# Patient Record
Sex: FEMALE | Race: ASIAN | NOT HISPANIC OR LATINO | Employment: UNEMPLOYED | URBAN - METROPOLITAN AREA
[De-identification: names, ages, dates, MRNs, and addresses within clinical notes are randomized per-mention and may not be internally consistent; named-entity substitution may affect disease eponyms.]

---

## 2023-03-09 LAB — HCG, URINE: NORMAL

## 2023-03-16 LAB — HCG, URINE: NORMAL

## 2023-03-28 LAB — SARS-COV-2 RESULT: NOT DETECTED

## 2023-03-29 ENCOUNTER — HOSPITAL ENCOUNTER (OUTPATIENT)
Dept: DATA CONVERSION | Facility: HOSPITAL | Age: 38
End: 2023-03-30
Attending: SURGERY | Admitting: SURGERY
Payer: COMMERCIAL

## 2023-03-29 DIAGNOSIS — C73 MALIGNANT NEOPLASM OF THYROID GLAND (MULTI): ICD-10-CM

## 2023-03-29 DIAGNOSIS — C50.919 MALIGNANT NEOPLASM OF UNSPECIFIED SITE OF UNSPECIFIED FEMALE BREAST (MULTI): ICD-10-CM

## 2023-03-29 DIAGNOSIS — E04.2 NONTOXIC MULTINODULAR GOITER: ICD-10-CM

## 2023-03-29 DIAGNOSIS — E06.3 AUTOIMMUNE THYROIDITIS: ICD-10-CM

## 2023-03-30 LAB
CALCIUM (MG/DL) IN SER/PLAS: 9.3 MG/DL (ref 8.6–10.6)
CALCIUM (MG/DL) IN SER/PLAS: 9.6 MG/DL (ref 8.6–10.6)
CALCIUM (MG/DL) IN SER/PLAS: NORMAL

## 2023-04-05 LAB — HCG, URINE: NEGATIVE

## 2023-04-14 LAB
ACTIVATED PARTIAL THROMBOPLASTIN TIME IN PPP BY COAGULATION ASSAY: 37 SEC (ref 26–39)
CALCIUM (MG/DL) IN SER/PLAS: 9.8 MG/DL (ref 8.6–10.3)
INR IN PPP BY COAGULATION ASSAY: 1 (ref 0.9–1.1)
PROTHROMBIN TIME (PT) IN PPP BY COAGULATION ASSAY: 12 SEC (ref 9.8–13.4)
THYROTROPIN (MIU/L) IN SER/PLAS BY DETECTION LIMIT <= 0.05 MIU/L: 0.92 MIU/L (ref 0.44–3.98)

## 2023-04-17 LAB
THYROGLOBULIN AB (IU/ML) IN SER/PLAS: 1.6 IU/ML (ref 0–4)
THYROGLOBULIN LC-MS/MS: ABNORMAL NG/ML (ref 1.3–31.8)
THYROGLOBULIN: 0.1 NG/ML (ref 1.3–31.8)

## 2023-04-19 LAB
COMPLETE PATHOLOGY REPORT: NORMAL
CONVERTED ADDENDUM COMMENT 2: NORMAL
CONVERTED CLINICAL DIAGNOSIS-HISTORY: NORMAL
CONVERTED FINAL DIAGNOSIS: NORMAL
CONVERTED FINAL REPORT PDF LINK TO COPY AND PASTE: NORMAL
CONVERTED GROSS DESCRIPTION: NORMAL
CONVERTED INTRAOPERATIVE DIAGNOSIS: NORMAL
CONVERTED SYNOPTIC DIAGNOSIS: NORMAL

## 2023-08-24 LAB
BASOPHILS (10*3/UL) IN BLOOD BY AUTOMATED COUNT: 0.02 X10E9/L (ref 0–0.1)
BASOPHILS/100 LEUKOCYTES IN BLOOD BY AUTOMATED COUNT: 0.5 % (ref 0–2)
CHOLESTEROL (MG/DL) IN SER/PLAS: 169 MG/DL (ref 0–199)
CHOLESTEROL IN HDL (MG/DL) IN SER/PLAS: 33 MG/DL
CHOLESTEROL/HDL RATIO: 5.1
EOSINOPHILS (10*3/UL) IN BLOOD BY AUTOMATED COUNT: 0.04 X10E9/L (ref 0–0.7)
EOSINOPHILS/100 LEUKOCYTES IN BLOOD BY AUTOMATED COUNT: 1 % (ref 0–6)
ERYTHROCYTE DISTRIBUTION WIDTH (RATIO) BY AUTOMATED COUNT: 14 % (ref 11.5–14.5)
ERYTHROCYTE MEAN CORPUSCULAR HEMOGLOBIN CONCENTRATION (G/DL) BY AUTOMATED: 32.1 G/DL (ref 32–36)
ERYTHROCYTE MEAN CORPUSCULAR VOLUME (FL) BY AUTOMATED COUNT: 82 FL (ref 80–100)
ERYTHROCYTES (10*6/UL) IN BLOOD BY AUTOMATED COUNT: 4.72 X10E12/L (ref 4–5.2)
ESTIMATED AVERAGE GLUCOSE FOR HBA1C: 103 MG/DL
HEMATOCRIT (%) IN BLOOD BY AUTOMATED COUNT: 38.6 % (ref 36–46)
HEMOGLOBIN (G/DL) IN BLOOD: 12.4 G/DL (ref 12–16)
HEMOGLOBIN A1C/HEMOGLOBIN TOTAL IN BLOOD: 5.2 %
IMMATURE GRANULOCYTES/100 LEUKOCYTES IN BLOOD BY AUTOMATED COUNT: 0.2 % (ref 0–0.9)
IRON (UG/DL) IN SER/PLAS: 67 UG/DL (ref 35–150)
IRON BINDING CAPACITY (UG/DL) IN SER/PLAS: 386 UG/DL (ref 240–445)
IRON SATURATION (%) IN SER/PLAS: 17 % (ref 25–45)
LDL: ABNORMAL MG/DL (ref 0–99)
LEUKOCYTES (10*3/UL) IN BLOOD BY AUTOMATED COUNT: 4 X10E9/L (ref 4.4–11.3)
LYMPHOCYTES (10*3/UL) IN BLOOD BY AUTOMATED COUNT: 1.63 X10E9/L (ref 1.2–4.8)
LYMPHOCYTES/100 LEUKOCYTES IN BLOOD BY AUTOMATED COUNT: 40.4 % (ref 13–44)
MONOCYTES (10*3/UL) IN BLOOD BY AUTOMATED COUNT: 0.26 X10E9/L (ref 0.1–1)
MONOCYTES/100 LEUKOCYTES IN BLOOD BY AUTOMATED COUNT: 6.5 % (ref 2–10)
NEUTROPHILS (10*3/UL) IN BLOOD BY AUTOMATED COUNT: 2.07 X10E9/L (ref 1.2–7.7)
NEUTROPHILS/100 LEUKOCYTES IN BLOOD BY AUTOMATED COUNT: 51.4 % (ref 40–80)
NRBC (PER 100 WBCS) BY AUTOMATED COUNT: 0 /100 WBC (ref 0–0)
PLATELETS (10*3/UL) IN BLOOD AUTOMATED COUNT: 211 X10E9/L (ref 150–450)
THYROTROPIN (MIU/L) IN SER/PLAS BY DETECTION LIMIT <= 0.05 MIU/L: 2.39 MIU/L (ref 0.44–3.98)
TRIGLYCERIDE (MG/DL) IN SER/PLAS: 525 MG/DL (ref 0–149)
VLDL: ABNORMAL MG/DL (ref 0–40)

## 2023-09-05 LAB
ALANINE AMINOTRANSFERASE (SGPT) (U/L) IN SER/PLAS: 15 U/L (ref 7–45)
ALBUMIN (G/DL) IN SER/PLAS: 4.4 G/DL (ref 3.4–5)
ALKALINE PHOSPHATASE (U/L) IN SER/PLAS: 48 U/L (ref 33–110)
ANION GAP IN SER/PLAS: 13 MMOL/L (ref 10–20)
ASPARTATE AMINOTRANSFERASE (SGOT) (U/L) IN SER/PLAS: 20 U/L (ref 9–39)
BILIRUBIN TOTAL (MG/DL) IN SER/PLAS: 0.3 MG/DL (ref 0–1.2)
CALCIDIOL (25 OH VITAMIN D3) (NG/ML) IN SER/PLAS: 43 NG/ML
CALCIUM (MG/DL) IN SER/PLAS: 9.5 MG/DL (ref 8.6–10.3)
CARBON DIOXIDE, TOTAL (MMOL/L) IN SER/PLAS: 25 MMOL/L (ref 21–32)
CHLORIDE (MMOL/L) IN SER/PLAS: 105 MMOL/L (ref 98–107)
CREATININE (MG/DL) IN SER/PLAS: 0.54 MG/DL (ref 0.5–1.05)
GFR FEMALE: >90 ML/MIN/1.73M2
GLUCOSE (MG/DL) IN SER/PLAS: 92 MG/DL (ref 74–99)
PARATHYRIN INTACT (PG/ML) IN SER/PLAS: 34.2 PG/ML (ref 18.5–88)
POTASSIUM (MMOL/L) IN SER/PLAS: 4.3 MMOL/L (ref 3.5–5.3)
PROTEIN TOTAL: 8.3 G/DL (ref 6.4–8.2)
SODIUM (MMOL/L) IN SER/PLAS: 139 MMOL/L (ref 136–145)
THYROTROPIN (MIU/L) IN SER/PLAS BY DETECTION LIMIT <= 0.05 MIU/L: 3.62 MIU/L (ref 0.44–3.98)
UREA NITROGEN (MG/DL) IN SER/PLAS: 10 MG/DL (ref 6–23)

## 2023-09-07 VITALS
TEMPERATURE: 99 F | HEIGHT: 60 IN | SYSTOLIC BLOOD PRESSURE: 135 MMHG | DIASTOLIC BLOOD PRESSURE: 76 MMHG | BODY MASS INDEX: 41.25 KG/M2 | RESPIRATION RATE: 16 BRPM | HEART RATE: 82 BPM | WEIGHT: 210.1 LBS

## 2023-09-08 LAB
THYROGLOBULIN AB (IU/ML) IN SER/PLAS: 1.4 IU/ML (ref 0–4)
THYROGLOBULIN LC-MS/MS: ABNORMAL NG/ML (ref 1.3–31.8)
THYROGLOBULIN: 0.1 NG/ML (ref 1.3–31.8)

## 2023-09-10 PROBLEM — E04.2 MULTINODULAR THYROID: Status: ACTIVE | Noted: 2023-09-10

## 2023-09-10 PROBLEM — K92.1 HEMATOCHEZIA: Status: ACTIVE | Noted: 2023-09-10

## 2023-09-10 PROBLEM — F41.0 PANIC ATTACK: Status: ACTIVE | Noted: 2023-09-10

## 2023-09-10 PROBLEM — N63.20 UNSPECIFIED LUMP IN THE LEFT BREAST, UNSPECIFIED QUADRANT: Status: ACTIVE | Noted: 2023-09-10

## 2023-09-10 PROBLEM — R53.1 WEAKNESS PRESENT: Status: ACTIVE | Noted: 2023-09-10

## 2023-09-10 PROBLEM — C44.92 SCC (SQUAMOUS CELL CARCINOMA): Status: ACTIVE | Noted: 2023-09-10

## 2023-09-10 PROBLEM — E87.6 HYPOKALEMIA: Status: ACTIVE | Noted: 2023-09-10

## 2023-09-10 PROBLEM — E86.0 MILD DEHYDRATION: Status: ACTIVE | Noted: 2022-11-03

## 2023-09-10 PROBLEM — D21.9 FIBROIDS: Status: ACTIVE | Noted: 2023-09-10

## 2023-09-10 PROBLEM — R92.8 ABNORMAL MAMMOGRAM: Status: ACTIVE | Noted: 2023-09-10

## 2023-09-10 PROBLEM — K64.9 HEMORRHOIDS: Status: ACTIVE | Noted: 2023-09-10

## 2023-09-10 PROBLEM — C50.912 INVASIVE DUCTAL CARCINOMA OF LEFT BREAST (MULTI): Status: ACTIVE | Noted: 2023-09-10

## 2023-09-10 PROBLEM — N95.9 PREMENOPAUSAL PATIENT: Status: ACTIVE | Noted: 2023-09-10

## 2023-09-10 PROBLEM — N64.52 BLOODY DISCHARGE FROM LEFT NIPPLE: Status: ACTIVE | Noted: 2023-09-10

## 2023-09-10 RX ORDER — POLYETHYLENE GLYCOL 3350 17 G/17G
17 POWDER, FOR SOLUTION ORAL 2 TIMES DAILY
COMMUNITY
End: 2023-10-25 | Stop reason: ALTCHOICE

## 2023-09-10 RX ORDER — GARLIC 1000 MG
CAPSULE ORAL
COMMUNITY
Start: 2022-08-01

## 2023-09-10 RX ORDER — TRAMADOL HYDROCHLORIDE 50 MG/1
50 TABLET ORAL AS NEEDED
COMMUNITY
Start: 2023-02-09 | End: 2023-10-25

## 2023-09-10 RX ORDER — PROCHLORPERAZINE MALEATE 10 MG
1 TABLET ORAL EVERY 6 HOURS PRN
COMMUNITY
Start: 2022-09-16 | End: 2023-10-25 | Stop reason: ALTCHOICE

## 2023-09-10 RX ORDER — MULTIVITAMIN WITH IRON
TABLET ORAL
COMMUNITY
Start: 2022-08-01

## 2023-09-10 RX ORDER — LEVOTHYROXINE SODIUM 75 UG/1
88 TABLET ORAL DAILY
COMMUNITY

## 2023-09-10 RX ORDER — ONDANSETRON HYDROCHLORIDE 8 MG/1
1 TABLET, FILM COATED ORAL EVERY 8 HOURS PRN
COMMUNITY
Start: 2022-09-16 | End: 2023-10-25

## 2023-09-10 RX ORDER — TAMOXIFEN CITRATE 20 MG/1
TABLET ORAL
COMMUNITY
End: 2023-11-14 | Stop reason: SINTOL

## 2023-09-10 RX ORDER — HYDROCORTISONE ACETATE 25 MG/1
25 SUPPOSITORY RECTAL 2 TIMES DAILY
COMMUNITY
End: 2023-10-25 | Stop reason: ALTCHOICE

## 2023-09-10 RX ORDER — NYSTATIN 100000 [USP'U]/ML
4 SUSPENSION ORAL 4 TIMES DAILY
COMMUNITY
Start: 2022-09-19 | End: 2024-01-17 | Stop reason: WASHOUT

## 2023-09-14 NOTE — DISCHARGE SUMMARY
Send Summary:   Discharge Summary Providers:  Provider Role Provider Name   · Attending Hakeem Diane   · Referring Meño Isaac       Note Recipients: Hakeem Diane MD       Discharge:    Summary:   Admission Date: .29-Mar-2023 11:48:00   Discharge Date: 30-Mar-2023   Attending Physician at Discharge: Hakeem Diane   Admission Reason: Papillary thyroid cancer   Final Discharge Diagnoses: Papillary thyroid cancer   Procedures: Date: 29-Mar-2023 17:31:00  Procedure Name: Total thyroidectomy   Condition at Discharge: Satisfactory   Disposition at Discharge: .Home   Vital Signs:        T   P  R  BP   MAP  SpO2   Value  36.4  82  16  121/79      96%  Date/Time 3/30 5:43 3/30 5:43 3/30 5:43 3/30 5:43    3/30 5:43  Range  (36.1C - 37.7C )  (76 - 118 )  (16 - 18 )  (121 - 134 )/ (79 - 87 )    (94% - 98% )  Highest temp of 37.7 C was recorded at 3/29 19:12    Date:            Weight/Scale Type:  Height:   29-Mar-2023 20:08  95.3  kg / standing  152.9  cm  Physical Exam:    Neurological: Awake, alert, conversive  Head/Neck: incision well approximated with steri-strips; no erythema, drainage, swelling noted.  negative Chovstek's.  no perioral numbness/tingling.  Respiratory/Thorax: even, unlabored  Genitourinary: voiding  Skin: warm, dry  Musculoskeletal: ALANIZ  Extremities: no numbness/tingling  Psychological: appropriate mood/affect     Hospital Course:    38 yr old female with papillary thyroid cancer who underwent total thyroidectomy on 3/29 with Dr Diane.  Post-op course uncomplicated.  Post-op Ca levels 9.3 and  9.6.  DC home POD 1.        Discharge Information:    and Continuing Care:   Lab Results - Pending:    Surgical Pathology Drawn at 29-Mar-2023 16:34:00  Surgical Pathology Drawn at 29-Mar-2023 15:29:00  Radiology Results - Pending: None   Discharge Instructions:    Activity:           activity as tolerated.          May shower..            May not return to school/work.            May not drive while  taking narcotics.            No pushing, pulling, or lifting objects greater than 10 pounds.      Nutrition/Diet:           resume normal diet    Wound Care:           Wound Site:   neck          Wound Type:   surgical incision          Cleanse With:   soap and water          Cover With:   no dressing, leave open to air          Instructions:   no lotions, creams, or tub soaks          Other Instructions:   You have steri strips (small paper tape) along your incision. You can shower, pat dry; do not soak in a tub.  The steri strips will fall off on their own; do not peel them off.    Follow Up Appointments:    Follow-Up Appointment 01:           Physician/Dept/Service:   Dr Diane          Reason for Referral:   post-op appointment          Call to Schedule in:   2 weeks          Phone Number:   [phone]    Discharge Medications: Home Medication   omega-3 polyunsaturated fatty acids 1000 mg oral capsule - 1 cap(s) orally once a day  Garlic Oil oral capsule - 1 cap(s) orally once a day  Antioxidant Multiple Vitamins and Minerals oral tablet - 1 tab(s) orally once a day  lactobacillus acidophilus oral capsule - 1 cap(s) orally once a day  acetaminophen 325 mg oral tablet - 2 tab(s) orally every 6 hours  levothyroxine 75 mcg (0.075 mg) oral tablet - 1 tab(s) orally once a day     PRN Medication   traMADol 50 mg oral tablet - 1 tab(s) orally every 4 to 6 hours x 7 days, As Needed -for pain   Dx: G89.18     DNR Status:   ·  Code Status Code Status order at time of discharge: Full Code       Electronic Signatures:  Millicent Cantrell (APRN-CNP)  (Signed 30-Mar-2023 09:25)   Authored: Send Summary, Summary Content, Ongoing Care,  DNR Status, Note Completion      Last Updated: 30-Mar-2023 09:25 by Millicent Cantrell (APRN-CNP)

## 2023-09-14 NOTE — H&P
History of Present Illness:   Pregnant/Lactating:  ·  Are You Pregnant no (1)   ·  Are You Currently Breastfeeding no (1)     History Present Illness:  Reason for surgery: Papillary thyroid cancer   HPI:    37 y/o F with histoyry of IDC found to have thyroid nodule on PET CT. Thyroid US showed 1.3cm left TI-RADS 5 nodule for which FNA showed papillary thyroid cancer.  The patient is here today for total thyroidectomy.     Allergies:        Allergies:  ·  amoxicillin : Anaphylaxis, Hives/Urticaria    Home Medication Review:   Home Medications Reviewed: yes     Impression/Procedure:   ·  Impression and Planned Procedure: Total thyroidectomy       ERAS (Enhanced Recovery After Surgery):  ·  ERAS Patient: no       Vital Signs:  Temperature C: 37.2 degrees C   Temperature F: 98.9 degrees F   Heart Rate: 82 beats per minute   Respiratory Rate: 16 breath per minute   Blood Pressure Systolic: 135 mm/Hg   Blood Pressure Diastolic: 76 mm/Hg     Physical Exam by System:    Head/Neck: Trachea midline   Respiratory/Thorax: Nonlabored on RA   Cardiovascular: Regular rate   Extremities: No peripheral edema   Skin: Warm and dry, no lesions, no rashes     Consent:   COVID-19 Consent:  ·  COVID-19 Risk Consent Surgeon has reviewed key risks related to the risk of vega COVID-19 and if they contract COVID-19 what the risks are.     Attestation:   Note Completion:  I am a:  Resident/Fellow   Attending Attestation I saw and evaluated the patient.  I personally obtained the key and critical portions of the history and physical exam or was physically present for key and  critical portions performed by the resident/fellow. I reviewed the resident/fellow?s documentation and discussed the patient with the resident/fellow.  I agree with the resident/fellow?s medical decision making as documented in the note.     I personally evaluated the patient on 29-Mar-2023         Electronic Signatures:  Heide Corley (MD (Resident))   "(Signed 29-Mar-2023 13:58)   Authored: History of Present Illness, Allergies, Home  Medication Review, Impression/Procedure, ERAS, Physical Exam, Consent, Note Completion  Hakeem Diane)  (Signed 30-Mar-2023 08:29)   Authored: Note Completion   Co-Signer: History of Present Illness, Allergies, Home Medication Review, Impression/Procedure, ERAS, Physical Exam, Consent, Note Completion      Last Updated: 30-Mar-2023 08:29 by Hakeem Diane)    References:  1.  Data Referenced From \"Patient Profile - Preop v3\" 29-Mar-2023 12:04   "

## 2023-09-17 ENCOUNTER — ANCILLARY ORDERS (OUTPATIENT)
Dept: CARDIOLOGY | Facility: HOSPITAL | Age: 38
End: 2023-09-17
Payer: COMMERCIAL

## 2023-09-17 DIAGNOSIS — Z51.81 ENCOUNTER FOR MONITORING CARDIOTOXIC DRUG THERAPY: Primary | ICD-10-CM

## 2023-09-17 DIAGNOSIS — Z79.899 ENCOUNTER FOR MONITORING CARDIOTOXIC DRUG THERAPY: Primary | ICD-10-CM

## 2023-09-18 VITALS — HEIGHT: 61 IN | BODY MASS INDEX: 18.61 KG/M2 | WEIGHT: 98.55 LBS

## 2023-09-18 DIAGNOSIS — C50.912 INVASIVE DUCTAL CARCINOMA OF LEFT BREAST (MULTI): Primary | ICD-10-CM

## 2023-09-18 RX ORDER — EPINEPHRINE 0.3 MG/.3ML
0.3 INJECTION SUBCUTANEOUS EVERY 5 MIN PRN
Status: CANCELLED | OUTPATIENT
Start: 2023-10-13

## 2023-09-18 RX ORDER — PROCHLORPERAZINE EDISYLATE 5 MG/ML
10 INJECTION INTRAMUSCULAR; INTRAVENOUS EVERY 6 HOURS PRN
Status: CANCELLED | OUTPATIENT
Start: 2023-10-13

## 2023-09-18 RX ORDER — HEPARIN 100 UNIT/ML
500 SYRINGE INTRAVENOUS AS NEEDED
Status: CANCELLED | OUTPATIENT
Start: 2023-09-30

## 2023-09-18 RX ORDER — HEPARIN SODIUM,PORCINE/PF 10 UNIT/ML
50 SYRINGE (ML) INTRAVENOUS AS NEEDED
Status: CANCELLED | OUTPATIENT
Start: 2023-09-30

## 2023-09-18 RX ORDER — ALBUTEROL SULFATE 0.83 MG/ML
3 SOLUTION RESPIRATORY (INHALATION) AS NEEDED
Status: CANCELLED | OUTPATIENT
Start: 2023-10-13

## 2023-09-18 RX ORDER — DIPHENHYDRAMINE HYDROCHLORIDE 50 MG/ML
50 INJECTION INTRAMUSCULAR; INTRAVENOUS AS NEEDED
Status: CANCELLED | OUTPATIENT
Start: 2023-10-13

## 2023-09-18 RX ORDER — FAMOTIDINE 10 MG/ML
20 INJECTION INTRAVENOUS ONCE AS NEEDED
Status: CANCELLED | OUTPATIENT
Start: 2023-10-13

## 2023-09-18 RX ORDER — PROCHLORPERAZINE MALEATE 10 MG
10 TABLET ORAL EVERY 6 HOURS PRN
Status: CANCELLED | OUTPATIENT
Start: 2023-10-13

## 2023-10-02 NOTE — OP NOTE
PROCEDURE DETAILS    Preoperative Diagnosis:  Papillary thyroid cancer  Postoperative Diagnosis:  Papillary thyroid cancer  Surgeon: Hakeem Diane MD  Resident/Fellow/Other Assistant: Gordo Kenyon MD, Heide Corley MD    Procedure:  Total thyroidectomy  Anesthesia: General  Estimated Blood Loss: 10cc  Findings: 2 discrete firm nodules, one in each lobe of the thyroid  Specimens(s) Collected: yes,  Thyroid   Patient Returned To/Condition: PACU, stable    Date of Dictation: 29-Mar-2023                            Attestation:   Note Completion:  Attending Attestation I was present for the entire procedure    I am a: Resident/Fellow         Electronic Signatures:  Gordo Kenyon (Resident))  (Signed 29-Mar-2023 17:32)   Authored: Post-Operative Note, Chart Review, Note Completion  Hakeem Diane)  (Signed 30-Mar-2023 08:30)   Authored: Post-Operative Note, Chart Review, Note Completion   Co-Signer: Post-Operative Note, Chart Review, Note Completion      Last Updated: 30-Mar-2023 08:30 by Hakeem Diane)

## 2023-10-13 ENCOUNTER — INFUSION (OUTPATIENT)
Dept: HEMATOLOGY/ONCOLOGY | Facility: CLINIC | Age: 38
End: 2023-10-13
Payer: COMMERCIAL

## 2023-10-13 VITALS
HEIGHT: 61 IN | TEMPERATURE: 97.7 F | BODY MASS INDEX: 18.27 KG/M2 | RESPIRATION RATE: 16 BRPM | SYSTOLIC BLOOD PRESSURE: 103 MMHG | WEIGHT: 96.78 LBS | HEART RATE: 74 BPM | OXYGEN SATURATION: 98 % | DIASTOLIC BLOOD PRESSURE: 60 MMHG

## 2023-10-13 DIAGNOSIS — C50.912 INVASIVE DUCTAL CARCINOMA OF LEFT BREAST (MULTI): ICD-10-CM

## 2023-10-13 DIAGNOSIS — C50.912 INVASIVE DUCTAL CARCINOMA OF LEFT BREAST (MULTI): Primary | ICD-10-CM

## 2023-10-13 PROCEDURE — 96417 CHEMO IV INFUS EACH ADDL SEQ: CPT

## 2023-10-13 PROCEDURE — 96523 IRRIG DRUG DELIVERY DEVICE: CPT

## 2023-10-13 PROCEDURE — 2500000004 HC RX 250 GENERAL PHARMACY W/ HCPCS (ALT 636 FOR OP/ED): Performed by: NURSE PRACTITIONER

## 2023-10-13 PROCEDURE — 96413 CHEMO IV INFUSION 1 HR: CPT

## 2023-10-13 PROCEDURE — 2500000004 HC RX 250 GENERAL PHARMACY W/ HCPCS (ALT 636 FOR OP/ED): Mod: JZ | Performed by: INTERNAL MEDICINE

## 2023-10-13 RX ORDER — HEPARIN 100 UNIT/ML
500 SYRINGE INTRAVENOUS AS NEEDED
Status: CANCELLED | OUTPATIENT
Start: 2023-10-13

## 2023-10-13 RX ORDER — ALBUTEROL SULFATE 0.83 MG/ML
3 SOLUTION RESPIRATORY (INHALATION) AS NEEDED
Status: DISCONTINUED | OUTPATIENT
Start: 2023-10-13 | End: 2023-10-13 | Stop reason: HOSPADM

## 2023-10-13 RX ORDER — DIPHENHYDRAMINE HYDROCHLORIDE 50 MG/ML
50 INJECTION INTRAMUSCULAR; INTRAVENOUS AS NEEDED
Status: DISCONTINUED | OUTPATIENT
Start: 2023-10-13 | End: 2023-10-13 | Stop reason: HOSPADM

## 2023-10-13 RX ORDER — HEPARIN SODIUM,PORCINE/PF 10 UNIT/ML
50 SYRINGE (ML) INTRAVENOUS AS NEEDED
Status: CANCELLED | OUTPATIENT
Start: 2023-10-13

## 2023-10-13 RX ORDER — PROCHLORPERAZINE EDISYLATE 5 MG/ML
10 INJECTION INTRAMUSCULAR; INTRAVENOUS EVERY 6 HOURS PRN
Status: DISCONTINUED | OUTPATIENT
Start: 2023-10-13 | End: 2023-10-13 | Stop reason: HOSPADM

## 2023-10-13 RX ORDER — EPINEPHRINE 0.3 MG/.3ML
0.3 INJECTION SUBCUTANEOUS EVERY 5 MIN PRN
Status: DISCONTINUED | OUTPATIENT
Start: 2023-10-13 | End: 2023-10-13 | Stop reason: HOSPADM

## 2023-10-13 RX ORDER — HEPARIN SODIUM,PORCINE/PF 10 UNIT/ML
50 SYRINGE (ML) INTRAVENOUS AS NEEDED
Status: DISCONTINUED | OUTPATIENT
Start: 2023-10-13 | End: 2023-10-13 | Stop reason: HOSPADM

## 2023-10-13 RX ORDER — HEPARIN 100 UNIT/ML
500 SYRINGE INTRAVENOUS AS NEEDED
Status: DISCONTINUED | OUTPATIENT
Start: 2023-10-13 | End: 2023-10-13 | Stop reason: HOSPADM

## 2023-10-13 RX ORDER — FAMOTIDINE 10 MG/ML
20 INJECTION INTRAVENOUS ONCE AS NEEDED
Status: DISCONTINUED | OUTPATIENT
Start: 2023-10-13 | End: 2023-10-13 | Stop reason: HOSPADM

## 2023-10-13 RX ORDER — PROCHLORPERAZINE MALEATE 10 MG
10 TABLET ORAL EVERY 6 HOURS PRN
Status: DISCONTINUED | OUTPATIENT
Start: 2023-10-13 | End: 2023-10-13 | Stop reason: HOSPADM

## 2023-10-13 RX ADMIN — HEPARIN 500 UNITS: 100 SYRINGE at 13:07

## 2023-10-13 RX ADMIN — PERTUZUMAB 420 MG: 30 INJECTION, SOLUTION, CONCENTRATE INTRAVENOUS at 12:27

## 2023-10-13 RX ADMIN — TRASTUZUMAB-ANNS 268.2 MG: 420 INJECTION, POWDER, LYOPHILIZED, FOR SOLUTION INTRAVENOUS at 11:48

## 2023-10-13 ASSESSMENT — PAIN SCALES - GENERAL: PAINLEVEL: 0-NO PAIN

## 2023-10-13 NOTE — SIGNIFICANT EVENT
10/13/23 1105   Prechemo Checklist   Has the patient been in the hospital, ED, or urgent care since last date of service No   Chemo/Immuno Consent Signed Yes   Protocol/Indications Verified Yes   Confirmed to previous date/time of medication Yes   Compared to previous dose Yes   All medications are dated accurately Yes   Pregnancy Test Negative Not applicable   Parameters Met Yes   BSA/Weight-Height Verified Yes   Dose Calculations Verified Yes

## 2023-10-20 ENCOUNTER — HOSPITAL ENCOUNTER (OUTPATIENT)
Dept: CARDIOLOGY | Facility: HOSPITAL | Age: 38
Discharge: HOME | End: 2023-10-20
Payer: COMMERCIAL

## 2023-10-20 DIAGNOSIS — Z79.899 ENCOUNTER FOR MONITORING CARDIOTOXIC DRUG THERAPY: ICD-10-CM

## 2023-10-20 DIAGNOSIS — Z51.81 ENCOUNTER FOR MONITORING CARDIOTOXIC DRUG THERAPY: ICD-10-CM

## 2023-10-20 LAB — EJECTION FRACTION APICAL 4 CHAMBER: 66.3

## 2023-10-20 PROCEDURE — 76376 3D RENDER W/INTRP POSTPROCES: CPT | Performed by: INTERNAL MEDICINE

## 2023-10-20 PROCEDURE — 93308 TTE F-UP OR LMTD: CPT | Performed by: INTERNAL MEDICINE

## 2023-10-20 PROCEDURE — 93356 MYOCRD STRAIN IMG SPCKL TRCK: CPT | Performed by: INTERNAL MEDICINE

## 2023-10-20 PROCEDURE — 93356 MYOCRD STRAIN IMG SPCKL TRCK: CPT

## 2023-10-25 ENCOUNTER — OFFICE VISIT (OUTPATIENT)
Dept: CARDIOLOGY | Facility: HOSPITAL | Age: 38
End: 2023-10-25
Payer: COMMERCIAL

## 2023-10-25 VITALS
WEIGHT: 95.68 LBS | BODY MASS INDEX: 18.06 KG/M2 | DIASTOLIC BLOOD PRESSURE: 68 MMHG | HEIGHT: 61 IN | RESPIRATION RATE: 16 BRPM | TEMPERATURE: 97.5 F | HEART RATE: 88 BPM | SYSTOLIC BLOOD PRESSURE: 112 MMHG | OXYGEN SATURATION: 99 %

## 2023-10-25 DIAGNOSIS — Z51.81 ENCOUNTER FOR MONITORING CARDIOTOXIC DRUG THERAPY: ICD-10-CM

## 2023-10-25 DIAGNOSIS — C50.912 INVASIVE DUCTAL CARCINOMA OF LEFT BREAST (MULTI): Primary | ICD-10-CM

## 2023-10-25 DIAGNOSIS — Z78.9 NEVER SMOKED ANY SUBSTANCE: ICD-10-CM

## 2023-10-25 DIAGNOSIS — Z79.899 ENCOUNTER FOR MONITORING CARDIOTOXIC DRUG THERAPY: ICD-10-CM

## 2023-10-25 DIAGNOSIS — R00.2 PALPITATIONS: ICD-10-CM

## 2023-10-25 PROCEDURE — 1036F TOBACCO NON-USER: CPT | Performed by: INTERNAL MEDICINE

## 2023-10-25 PROCEDURE — 99214 OFFICE O/P EST MOD 30 MIN: CPT | Performed by: INTERNAL MEDICINE

## 2023-10-25 PROCEDURE — 3008F BODY MASS INDEX DOCD: CPT | Performed by: INTERNAL MEDICINE

## 2023-10-25 RX ORDER — GLUCOSAM/CHONDRO/HERB 149/HYAL 750-100 MG
1000 TABLET ORAL
COMMUNITY
Start: 2022-09-12

## 2023-10-25 RX ORDER — ATORVASTATIN CALCIUM 20 MG/1
20 TABLET, FILM COATED ORAL DAILY
COMMUNITY
Start: 2023-08-28

## 2023-10-25 ASSESSMENT — ENCOUNTER SYMPTOMS
PSYCHIATRIC NEGATIVE: 1
CONSTITUTIONAL NEGATIVE: 1
MUSCULOSKELETAL NEGATIVE: 1
ALLERGIC/IMMUNOLOGIC NEGATIVE: 1
EYES NEGATIVE: 1
GASTROINTESTINAL NEGATIVE: 1
ENDOCRINE NEGATIVE: 1
HEMATOLOGIC/LYMPHATIC NEGATIVE: 1
NEUROLOGICAL NEGATIVE: 1
PALPITATIONS: 1
RESPIRATORY NEGATIVE: 1

## 2023-10-25 ASSESSMENT — PAIN SCALES - GENERAL: PAINLEVEL: 0-NO PAIN

## 2023-10-25 NOTE — PATIENT INSTRUCTIONS
Onco echo and follow up in January to evaluate your heart function since you will be done with herceptin  Labs 10/26

## 2023-10-25 NOTE — PROGRESS NOTES
Patient:  Argelia Clay  YOB: 1985  MRN: [id number]       Chief Complaint/Active Symptoms:       Argelia Clay is a 38 y.o. female who returns today for cardiac follow-up.    Patient with history of left breast cancer, HER2+ diagnosed 8/2022. Treated with neoTCHPx6 and now completed radiation therapy and on traztuzumab / pertuzumab for 1 year.    2nd malignancy - Thyroid papillary cancer s/p total thyroidectomy    Patient is doing well. Has no chest pain, shortness of breath, edema, dizziness or lightheadedness. Has been having intermittent palpitations. Has an occasional skipped beat that is uncommon and has no associated symptoms. She has had episodes of a rapid heart beat that lasts 15-20 minutes since she had her thyroid surgery in March. This used to occur twice a month but this past month she has only had it once. No syncope or near syncope associated with the symptoms.     Otherwise feels well with no complaints.     Review of Systems   Constitutional: Negative.    HENT: Negative.     Eyes: Negative.    Respiratory: Negative.     Cardiovascular:  Positive for palpitations. Negative for chest pain and leg swelling.   Gastrointestinal: Negative.    Endocrine: Negative.    Genitourinary: Negative.    Musculoskeletal: Negative.    Skin: Negative.    Allergic/Immunologic: Negative.    Neurological: Negative.    Hematological: Negative.    Psychiatric/Behavioral: Negative.         Objective:     Vitals:    10/25/23 1001   BP: 112/68   Pulse: 88   Resp: 16   Temp: 36.4 °C (97.5 °F)   SpO2: 99%       Allergies:     Allergies   Allergen Reactions    Amoxicillin Anaphylaxis, Hives, Itching and Other     Tacycardia          Medications:     Current Outpatient Medications   Medication Instructions    atorvastatin (LIPITOR) 20 mg, oral, Daily    fish oil concentrate (Omega-3) 120-180 mg capsule Fish Oil 1000 MG Oral Capsule   Refills: 0        Start : 1-Aug-2022   Active    garlic (garlic oiL) 1,000 mg capsule  Garlic Oil 1000 MG Oral Capsule   Refills: 0        Start : 1-Aug-2022   Active    levothyroxine (SYNTHROID, LEVOXYL) 75 mcg, oral, Daily    multivitamin (Multiple Vitamins) tablet Multiple Vitamins Oral Tablet   Refills: 0        Start : 1-Aug-2022   Active    nystatin (Mycostatin) 100,000 unit/mL suspension 4 mL, oral, 4 times daily    omega 3-dha-epa-fish oil (Fish OiL) 1,000 mg (120 mg-180 mg) capsule 1,000 mg    SIMETHICONE ORAL 1 capsule, oral, 4 times daily PRN    tamoxifen (Nolvadex) 20 mg tablet No dose, route, or frequency recorded.       Physical Examination:   GENERAL:  Well developed, well nourished, in no acute distress.  HEENT: NC AT, EOMI with anicteric sclera  NECK:  Supple, no JVD, no bruit.  CHEST:  Symmetric and nontender.  LUNGS:  Clear to auscultation bilaterally, normal respiratory effort.  HEART:  PMI is nondisplaced. RRR with normal S1 and S2, no S3, no mumur or rub. No carotid or abdominal bruits  ABDOMEN: Soft, NT, ND without palpable organomegaly or bruits  EXTREMITIES:  Warm with good color, no clubbing or cyanosis.  There is no edema noted.  PERIPHERAL VASCULAR:  Pulses present and equally palpable; 2+ throughout.  MUSCULOSKELETAL:   NEURO/PSYCH:  Alert and oriented times three with approppriate behavior and responses. Nonfocal motor examination with normal gait and ambulation  Lymph: No significant palpable lymphadenopathy  Skin: no rash or lesions on exposed skin or reported.    Lab:     CBC:   Lab Results   Component Value Date    WBC 4.0 (L) 08/24/2023    RBC 4.72 08/24/2023    HGB 12.4 08/24/2023    HCT 38.6 08/24/2023     08/24/2023        CMP:    Lab Results   Component Value Date     09/05/2023    K 4.3 09/05/2023     09/05/2023    CO2 25 09/05/2023    BUN 10 09/05/2023    CREATININE 0.54 09/05/2023    GLUCOSE 92 09/05/2023    CALCIUM 9.5 09/05/2023       Magnesium:    Lab Results   Component Value Date    MG 1.98 04/01/2023    MG CANCELED 04/01/2023  "      Lipid Profile:    Lab Results   Component Value Date    TRIG 242 (H) 09/28/2023    HDL 35.4 (A) 09/28/2023       TSH:    Lab Results   Component Value Date    TSH 3.62 09/05/2023       BNP:   Lab Results   Component Value Date    BNP 8 08/11/2023        PT/INR:    Lab Results   Component Value Date    PROTIME 12.0 04/14/2023    INR 1.0 04/14/2023       HgBA1c:    Lab Results   Component Value Date    HGBA1C 5.2 08/24/2023       BMP:  Lab Results   Component Value Date     09/05/2023     08/11/2023     05/19/2023    K 4.3 09/05/2023    K 3.3 (L) 08/11/2023    K 3.3 (L) 05/19/2023     09/05/2023     08/11/2023     05/19/2023    CO2 25 09/05/2023    CO2 26 08/11/2023    CO2 26 05/19/2023    BUN 10 09/05/2023    BUN 10 08/11/2023    BUN 10 05/19/2023    CREATININE 0.54 09/05/2023    CREATININE 0.50 08/11/2023    CREATININE 0.49 (L) 05/19/2023       Cardiac Enzymes:    Lab Results   Component Value Date    TROPHS <3 08/11/2023    TROPHS 3 10/28/2022       Hepatic Function Panel:    Lab Results   Component Value Date    ALKPHOS 48 09/05/2023    ALT 15 09/05/2023    AST 20 09/05/2023    PROT 8.3 (H) 09/05/2023    BILITOT 0.3 09/05/2023         Diagnostic Studies:     No echocardiogram results found for the past 12 months  Last echo in October 20, 2023 showed an EF of 60 to 65%.  No significant change from her previous study.  No nuclear medicine results found for the past 12 months    No valid procedures specified.    EKG:   No results found for: \"EKG\"  No recent EKGs  Radiology:     Onco-Echo Limited (Strain And 3D)    (Results Pending)   Recent echo results reviewed. LV function remains normal with normal strain. No concerning findings.       ASSESSMENT     Problem List Items Addressed This Visit       Invasive ductal carcinoma of left breast (CMS/HCC) - Primary    Relevant Orders    Onco-Echo Limited (Strain And 3D)    B-Type Natriuretic Peptide    Troponin I, High Sensitivity "    Encounter for monitoring cardiotoxic drug therapy    Relevant Orders    Onco-Echo Limited (Strain And 3D)    B-Type Natriuretic Peptide    Troponin I, High Sensitivity    Palpitations    Never smoked any substance     Other Visit Diagnoses       Adult BMI <19 kg/sq m                PLAN     1.  Breast cancer.  Encounter for monitoring cardiotoxicity.  Patient is doing well without signs or symptoms of heart failure.  She is coming to the end of her 1 year of trastuzumab and pertuzumab.  Her last echocardiogram shows no change in her LV function and has no signs or symptoms of myocarditis pericarditis.  Will do her washout echocardiogram early next year I will see her in follow-up sometime in the spring.  2.  Palpitations.  Sounds like she started having these episodes of rapid heart rate after her thyroid surgery.  It also sounds like they are decreasing in frequency.  We have talked to her given that her current frequency is about once a month using a wenceslao such as [employer] or an [employer] watch to record her heart rate when she has the symptoms.  If she does purchase 1 of these 2 options we will review it when she comes to her follow-up appointment.  If appropriate we can always order more specific monitor but this may be unhelpful if her episodes occur less than once a month.    We will see her in follow-up in the spring she knows to call if she has any problems or if she develops sustained tachycardia to seek early medical attention.

## 2023-10-26 ENCOUNTER — LAB (OUTPATIENT)
Dept: LAB | Facility: LAB | Age: 38
End: 2023-10-26
Payer: COMMERCIAL

## 2023-10-26 DIAGNOSIS — Z79.899 ENCOUNTER FOR MONITORING CARDIOTOXIC DRUG THERAPY: ICD-10-CM

## 2023-10-26 DIAGNOSIS — C50.812 MALIGNANT NEOPLASM OF OVERLAPPING SITES OF LEFT FEMALE BREAST (MULTI): ICD-10-CM

## 2023-10-26 DIAGNOSIS — C50.912 INVASIVE DUCTAL CARCINOMA OF LEFT BREAST (MULTI): ICD-10-CM

## 2023-10-26 DIAGNOSIS — Z51.81 ENCOUNTER FOR MONITORING CARDIOTOXIC DRUG THERAPY: ICD-10-CM

## 2023-10-26 LAB
BNP SERPL-MCNC: 2 PG/ML (ref 0–99)
CARDIAC TROPONIN I PNL SERPL HS: <3 NG/L (ref 0–34)
CHOLEST SERPL-MCNC: 96 MG/DL (ref 0–199)
CHOLESTEROL/HDL RATIO: 3
HDLC SERPL-MCNC: 32.2 MG/DL
LDLC SERPL CALC-MCNC: 17 MG/DL
NON HDL CHOLESTEROL: 64 MG/DL (ref 0–149)
TRIGL SERPL-MCNC: 235 MG/DL (ref 0–149)
VLDL: 47 MG/DL (ref 0–40)

## 2023-10-26 PROCEDURE — 83880 ASSAY OF NATRIURETIC PEPTIDE: CPT

## 2023-10-26 PROCEDURE — 84484 ASSAY OF TROPONIN QUANT: CPT

## 2023-10-26 PROCEDURE — 80061 LIPID PANEL: CPT

## 2023-11-01 DIAGNOSIS — C50.912 INVASIVE DUCTAL CARCINOMA OF LEFT BREAST (MULTI): Primary | ICD-10-CM

## 2023-11-01 PROBLEM — L98.9 SKIN LESION: Status: ACTIVE | Noted: 2023-08-07

## 2023-11-01 PROBLEM — D64.9 ANEMIA: Status: ACTIVE | Noted: 2022-04-20

## 2023-11-01 PROBLEM — O44.20: Status: ACTIVE | Noted: 2022-03-07

## 2023-11-01 PROBLEM — N91.2 AMENORRHEA: Status: ACTIVE | Noted: 2021-09-22

## 2023-11-01 PROBLEM — C50.919 BREAST CANCER (MULTI): Status: ACTIVE | Noted: 2023-11-01

## 2023-11-01 PROBLEM — E55.9 VITAMIN D DEFICIENCY: Status: ACTIVE | Noted: 2021-11-23

## 2023-11-01 PROBLEM — O24.419 GESTATIONAL DIABETES MELLITUS (HHS-HCC): Status: ACTIVE | Noted: 2022-01-24

## 2023-11-01 PROBLEM — Z22.330 CARRIER OF GROUP B STREPTOCOCCUS: Status: ACTIVE | Noted: 2021-09-27

## 2023-11-01 RX ORDER — EPINEPHRINE 0.3 MG/.3ML
0.3 INJECTION SUBCUTANEOUS EVERY 5 MIN PRN
Status: CANCELLED | OUTPATIENT
Start: 2023-11-03

## 2023-11-01 RX ORDER — PROCHLORPERAZINE MALEATE 10 MG
10 TABLET ORAL EVERY 6 HOURS PRN
Status: CANCELLED | OUTPATIENT
Start: 2023-11-03

## 2023-11-01 RX ORDER — FAMOTIDINE 10 MG/ML
20 INJECTION INTRAVENOUS ONCE AS NEEDED
Status: CANCELLED | OUTPATIENT
Start: 2023-11-03

## 2023-11-01 RX ORDER — ALBUTEROL SULFATE 0.83 MG/ML
3 SOLUTION RESPIRATORY (INHALATION) AS NEEDED
Status: CANCELLED | OUTPATIENT
Start: 2023-11-03

## 2023-11-01 RX ORDER — DIPHENHYDRAMINE HYDROCHLORIDE 50 MG/ML
50 INJECTION INTRAMUSCULAR; INTRAVENOUS AS NEEDED
Status: CANCELLED | OUTPATIENT
Start: 2023-11-03

## 2023-11-01 RX ORDER — FERROUS SULFATE 325(65) MG
1 TABLET ORAL
COMMUNITY
Start: 2022-09-12 | End: 2024-01-17 | Stop reason: WASHOUT

## 2023-11-01 RX ORDER — PROCHLORPERAZINE EDISYLATE 5 MG/ML
10 INJECTION INTRAMUSCULAR; INTRAVENOUS EVERY 6 HOURS PRN
Status: CANCELLED | OUTPATIENT
Start: 2023-11-03

## 2023-11-01 RX ORDER — POLYETHYLENE GLYCOL 3350 17 G/17G
17 POWDER, FOR SOLUTION ORAL 2 TIMES DAILY
COMMUNITY
Start: 2023-06-12 | End: 2023-11-09 | Stop reason: ALTCHOICE

## 2023-11-03 ENCOUNTER — INFUSION (OUTPATIENT)
Dept: HEMATOLOGY/ONCOLOGY | Facility: CLINIC | Age: 38
End: 2023-11-03
Payer: COMMERCIAL

## 2023-11-03 ENCOUNTER — SOCIAL WORK (OUTPATIENT)
Dept: HEMATOLOGY/ONCOLOGY | Facility: CLINIC | Age: 38
End: 2023-11-03
Payer: COMMERCIAL

## 2023-11-03 VITALS
OXYGEN SATURATION: 99 % | RESPIRATION RATE: 18 BRPM | BODY MASS INDEX: 18.49 KG/M2 | SYSTOLIC BLOOD PRESSURE: 113 MMHG | TEMPERATURE: 97 F | WEIGHT: 97.66 LBS | DIASTOLIC BLOOD PRESSURE: 67 MMHG | HEART RATE: 70 BPM

## 2023-11-03 DIAGNOSIS — C50.912 INVASIVE DUCTAL CARCINOMA OF LEFT BREAST (MULTI): ICD-10-CM

## 2023-11-03 DIAGNOSIS — C50.812 MALIGNANT NEOPLASM OF OVERLAPPING SITES OF LEFT FEMALE BREAST (MULTI): ICD-10-CM

## 2023-11-03 PROCEDURE — 96523 IRRIG DRUG DELIVERY DEVICE: CPT

## 2023-11-03 PROCEDURE — 2500000004 HC RX 250 GENERAL PHARMACY W/ HCPCS (ALT 636 FOR OP/ED): Mod: JZ | Performed by: STUDENT IN AN ORGANIZED HEALTH CARE EDUCATION/TRAINING PROGRAM

## 2023-11-03 PROCEDURE — 96417 CHEMO IV INFUS EACH ADDL SEQ: CPT

## 2023-11-03 PROCEDURE — 2500000004 HC RX 250 GENERAL PHARMACY W/ HCPCS (ALT 636 FOR OP/ED): Performed by: NURSE PRACTITIONER

## 2023-11-03 PROCEDURE — 96413 CHEMO IV INFUSION 1 HR: CPT

## 2023-11-03 RX ORDER — HEPARIN 100 UNIT/ML
500 SYRINGE INTRAVENOUS AS NEEDED
Status: DISCONTINUED | OUTPATIENT
Start: 2023-11-03 | End: 2023-11-03 | Stop reason: HOSPADM

## 2023-11-03 RX ORDER — ALBUTEROL SULFATE 0.83 MG/ML
3 SOLUTION RESPIRATORY (INHALATION) AS NEEDED
Status: DISCONTINUED | OUTPATIENT
Start: 2023-11-03 | End: 2023-11-03 | Stop reason: HOSPADM

## 2023-11-03 RX ORDER — EPINEPHRINE 0.3 MG/.3ML
0.3 INJECTION SUBCUTANEOUS EVERY 5 MIN PRN
Status: DISCONTINUED | OUTPATIENT
Start: 2023-11-03 | End: 2023-11-03 | Stop reason: HOSPADM

## 2023-11-03 RX ORDER — HEPARIN 100 UNIT/ML
500 SYRINGE INTRAVENOUS AS NEEDED
Status: CANCELLED | OUTPATIENT
Start: 2023-11-03

## 2023-11-03 RX ORDER — DIPHENHYDRAMINE HYDROCHLORIDE 50 MG/ML
50 INJECTION INTRAMUSCULAR; INTRAVENOUS AS NEEDED
Status: DISCONTINUED | OUTPATIENT
Start: 2023-11-03 | End: 2023-11-03 | Stop reason: HOSPADM

## 2023-11-03 RX ORDER — HEPARIN SODIUM,PORCINE/PF 10 UNIT/ML
50 SYRINGE (ML) INTRAVENOUS AS NEEDED
Status: CANCELLED | OUTPATIENT
Start: 2023-11-03

## 2023-11-03 RX ORDER — FAMOTIDINE 10 MG/ML
20 INJECTION INTRAVENOUS ONCE AS NEEDED
Status: DISCONTINUED | OUTPATIENT
Start: 2023-11-03 | End: 2023-11-03 | Stop reason: HOSPADM

## 2023-11-03 RX ADMIN — TRASTUZUMAB-ANNS 268.8 MG: 420 INJECTION, POWDER, LYOPHILIZED, FOR SOLUTION INTRAVENOUS at 12:16

## 2023-11-03 RX ADMIN — PERTUZUMAB 420 MG: 30 INJECTION, SOLUTION, CONCENTRATE INTRAVENOUS at 11:37

## 2023-11-03 RX ADMIN — HEPARIN 500 UNITS: 100 SYRINGE at 12:55

## 2023-11-03 ASSESSMENT — PATIENT HEALTH QUESTIONNAIRE - PHQ9
2. FEELING DOWN, DEPRESSED OR HOPELESS: NOT AT ALL
5. POOR APPETITE OR OVEREATING: NOT AT ALL
1. LITTLE INTEREST OR PLEASURE IN DOING THINGS: NOT AT ALL
6. FEELING BAD ABOUT YOURSELF - OR THAT YOU ARE A FAILURE OR HAVE LET YOURSELF OR YOUR FAMILY DOWN: NOT AT ALL
5. POOR APPETITE OR OVEREATING: 0
8. MOVING OR SPEAKING SO SLOWLY THAT OTHER PEOPLE COULD HAVE NOTICED. OR THE OPPOSITE, BEING SO FIGETY OR RESTLESS THAT YOU HAVE BEEN MOVING AROUND A LOT MORE THAN USUAL: NOT AT ALL
3. TROUBLE FALLING OR STAYING ASLEEP OR SLEEPING TOO MUCH: NOT AT ALL
4. FEELING TIRED OR HAVING LITTLE ENERGY: NOT AT ALL
7. TROUBLE CONCENTRATING ON THINGS, SUCH AS READING THE NEWSPAPER OR WATCHING TELEVISION: 0
3. TROUBLE FALLING OR STAYING ASLEEP OR SLEEPING TOO MUCH: NOT AT ALL
2. FEELING DOWN, DEPRESSED, IRRITABLE, OR HOPELESS: NOT AT ALL
9. THOUGHTS THAT YOU WOULD BE BETTER OFF DEAD, OR OF HURTING YOURSELF: 0
2. FEELING DOWN, DEPRESSED, IRRITABLE, OR HOPELESS: 0
1. LITTLE INTEREST OR PLEASURE IN DOING THINGS: NOT AT ALL
6. FEELING BAD ABOUT YOURSELF - OR THAT YOU ARE A FAILURE OR HAVE LET YOURSELF OR YOUR FAMILY DOWN: NOT AT ALL
7. TROUBLE CONCENTRATING ON THINGS, SUCH AS READING THE NEWSPAPER OR WATCHING TELEVISION: NOT AT ALL
2. FEELING DOWN, DEPRESSED OR HOPELESS: NOT AT ALL
8. MOVING OR SPEAKING SO SLOWLY THAT OTHER PEOPLE COULD HAVE NOTICED. OR THE OPPOSITE, BEING SO FIGETY OR RESTLESS THAT YOU HAVE BEEN MOVING AROUND A LOT MORE THAN USUAL: 0
9. THOUGHTS THAT YOU WOULD BE BETTER OFF DEAD, OR OF HURTING YOURSELF: NOT AT ALL
10. IF YOU CHECKED OFF ANY PROBLEMS, HOW DIFFICULT HAVE THESE PROBLEMS MADE IT FOR YOU TO DO YOUR WORK, TAKE CARE OF THINGS AT HOME, OR GET ALONG WITH OTHER PEOPLE: NOT DIFFICULT AT ALL
3. TROUBLE FALLING OR STAYING ASLEEP OR SLEEPING TOO MUCH: 0
SUM OF ALL RESPONSES TO PHQ QUESTIONS 1-9: 0
4. FEELING TIRED OR HAVING LITTLE ENERGY: NOT AT ALL
6. FEELING BAD ABOUT YOURSELF - OR THAT YOU ARE A FAILURE OR HAVE LET YOURSELF OR YOUR FAMILY DOWN: 0
8. MOVING OR SPEAKING SO SLOWLY THAT OTHER PEOPLE COULD HAVE NOTICED. OR THE OPPOSITE, BEING SO FIGETY OR RESTLESS THAT YOU HAVE BEEN MOVING AROUND A LOT MORE THAN USUAL: NOT AT ALL
SUM OF ALL RESPONSES TO PHQ9 QUESTIONS 1 AND 2: 0
7. TROUBLE CONCENTRATING ON THINGS, SUCH AS READING THE NEWSPAPER OR WATCHING TELEVISION: NOT AT ALL
1. LITTLE INTEREST OR PLEASURE IN DOING THINGS: NOT AT ALL
9. THOUGHTS THAT YOU WOULD BE BETTER OFF DEAD, OR OF HURTING YOURSELF: NOT AT ALL
10. IF YOU CHECKED OFF ANY PROBLEMS, HOW DIFFICULT HAVE THESE PROBLEMS MADE IT FOR YOU TO DO YOUR WORK, TAKE CARE OF THINGS AT HOME, OR GET ALONG WITH OTHER PEOPLE: NOT DIFFICULT AT ALL
1. LITTLE INTEREST OR PLEASURE IN DOING THINGS: 0
4. FEELING TIRED OR HAVING LITTLE ENERGY: 0
5. POOR APPETITE OR OVEREATING: NOT AT ALL
SUM OF ALL RESPONSES TO PHQ QUESTIONS 1-9: 0

## 2023-11-03 ASSESSMENT — COLUMBIA-SUICIDE SEVERITY RATING SCALE - C-SSRS
1. IN THE PAST MONTH, HAVE YOU WISHED YOU WERE DEAD OR WISHED YOU COULD GO TO SLEEP AND NOT WAKE UP?: NO
6. HAVE YOU EVER DONE ANYTHING, STARTED TO DO ANYTHING, OR PREPARED TO DO ANYTHING TO END YOUR LIFE?: NO
2. HAVE YOU ACTUALLY HAD ANY THOUGHTS OF KILLING YOURSELF?: NO

## 2023-11-03 ASSESSMENT — PAIN SCALES - GENERAL: PAINLEVEL: 0-NO PAIN

## 2023-11-03 NOTE — SIGNIFICANT EVENT
11/03/23 1045   Prechemo Checklist   Has the patient been in the hospital, ED, or urgent care since last date of service No   Chemo/Immuno Consent Signed Yes   Protocol/Indications Verified Yes   Confirmed to previous date/time of medication Yes   Compared to previous dose Yes   All medications are dated accurately Yes   Pregnancy Test Negative Yes   Parameters Met Yes   BSA/Weight-Height Verified Yes   Dose Calculations Verified Yes

## 2023-11-03 NOTE — PROGRESS NOTES
Followed up with pt. Pt here for herceptin and perjeta. Pt all in all is doing fine on her target treatments. Pt doing well but is having a chill that goes through the back of her skull and the front of her skull in the front. Pt mentions this happens 3-4 times a day. Pt noticed it was when she went back on the tamoxifen. Pt taking medication now with the tamoxifen to keep her triglycerides low. Pt all in all is going about her normal routine aside from that. Let pt know Onc [facility] would tell RN caring for pt today to put in a secure chat with Dr. Alvarenga to let her know pt is having chilling in her head. Reminded pt to call with any needs.

## 2023-11-08 ENCOUNTER — TELEPHONE (OUTPATIENT)
Dept: PREADMISSION TESTING | Facility: HOSPITAL | Age: 38
End: 2023-11-08

## 2023-11-09 ENCOUNTER — ANESTHESIA EVENT (OUTPATIENT)
Dept: GASTROENTEROLOGY | Facility: HOSPITAL | Age: 38
End: 2023-11-09
Payer: COMMERCIAL

## 2023-11-09 ENCOUNTER — HOSPITAL ENCOUNTER (OUTPATIENT)
Dept: GASTROENTEROLOGY | Facility: HOSPITAL | Age: 38
Setting detail: OUTPATIENT SURGERY
Discharge: HOME | End: 2023-11-09
Payer: COMMERCIAL

## 2023-11-09 ENCOUNTER — ANESTHESIA (OUTPATIENT)
Dept: GASTROENTEROLOGY | Facility: HOSPITAL | Age: 38
End: 2023-11-09
Payer: COMMERCIAL

## 2023-11-09 ENCOUNTER — PREP FOR PROCEDURE (OUTPATIENT)
Dept: GASTROENTEROLOGY | Facility: HOSPITAL | Age: 38
End: 2023-11-09

## 2023-11-09 VITALS
RESPIRATION RATE: 16 BRPM | SYSTOLIC BLOOD PRESSURE: 110 MMHG | DIASTOLIC BLOOD PRESSURE: 71 MMHG | TEMPERATURE: 97.3 F | HEART RATE: 64 BPM | OXYGEN SATURATION: 100 %

## 2023-11-09 DIAGNOSIS — Z12.11 ENCOUNTER FOR SCREENING FOR MALIGNANT NEOPLASM OF COLON: ICD-10-CM

## 2023-11-09 LAB — PREGNANCY TEST URINE, POC: NEGATIVE

## 2023-11-09 PROCEDURE — 81025 URINE PREGNANCY TEST: CPT | Performed by: STUDENT IN AN ORGANIZED HEALTH CARE EDUCATION/TRAINING PROGRAM

## 2023-11-09 PROCEDURE — 88305 TISSUE EXAM BY PATHOLOGIST: CPT | Mod: TC | Performed by: STUDENT IN AN ORGANIZED HEALTH CARE EDUCATION/TRAINING PROGRAM

## 2023-11-09 PROCEDURE — 3700000001 HC GENERAL ANESTHESIA TIME - INITIAL BASE CHARGE

## 2023-11-09 PROCEDURE — 7100000009 HC PHASE TWO TIME - INITIAL BASE CHARGE

## 2023-11-09 PROCEDURE — 45380 COLONOSCOPY AND BIOPSY: CPT | Performed by: STUDENT IN AN ORGANIZED HEALTH CARE EDUCATION/TRAINING PROGRAM

## 2023-11-09 PROCEDURE — 2500000004 HC RX 250 GENERAL PHARMACY W/ HCPCS (ALT 636 FOR OP/ED): Performed by: STUDENT IN AN ORGANIZED HEALTH CARE EDUCATION/TRAINING PROGRAM

## 2023-11-09 PROCEDURE — 2500000005 HC RX 250 GENERAL PHARMACY W/O HCPCS: Performed by: ANESTHESIOLOGIST ASSISTANT

## 2023-11-09 PROCEDURE — 7100000010 HC PHASE TWO TIME - EACH INCREMENTAL 1 MINUTE

## 2023-11-09 PROCEDURE — A45385 PR COLONOSCOPY,REMV LESN,SNARE: Performed by: ANESTHESIOLOGY

## 2023-11-09 PROCEDURE — 3700000002 HC GENERAL ANESTHESIA TIME - EACH INCREMENTAL 1 MINUTE

## 2023-11-09 PROCEDURE — 88305 TISSUE EXAM BY PATHOLOGIST: CPT | Mod: TC,SUR | Performed by: STUDENT IN AN ORGANIZED HEALTH CARE EDUCATION/TRAINING PROGRAM

## 2023-11-09 PROCEDURE — 2500000004 HC RX 250 GENERAL PHARMACY W/ HCPCS (ALT 636 FOR OP/ED): Performed by: ANESTHESIOLOGIST ASSISTANT

## 2023-11-09 PROCEDURE — 88305 TISSUE EXAM BY PATHOLOGIST: CPT | Performed by: PATHOLOGY

## 2023-11-09 PROCEDURE — 45385 COLONOSCOPY W/LESION REMOVAL: CPT | Performed by: STUDENT IN AN ORGANIZED HEALTH CARE EDUCATION/TRAINING PROGRAM

## 2023-11-09 PROCEDURE — A45385 PR COLONOSCOPY,REMV LESN,SNARE: Performed by: ANESTHESIOLOGIST ASSISTANT

## 2023-11-09 RX ORDER — PROPOFOL 10 MG/ML
INJECTION, EMULSION INTRAVENOUS AS NEEDED
Status: DISCONTINUED | OUTPATIENT
Start: 2023-11-09 | End: 2023-11-09

## 2023-11-09 RX ORDER — SODIUM CHLORIDE, SODIUM LACTATE, POTASSIUM CHLORIDE, CALCIUM CHLORIDE 600; 310; 30; 20 MG/100ML; MG/100ML; MG/100ML; MG/100ML
20 INJECTION, SOLUTION INTRAVENOUS CONTINUOUS
Status: CANCELLED | OUTPATIENT
Start: 2023-11-09

## 2023-11-09 RX ORDER — LIDOCAINE HCL/PF 100 MG/5ML
SYRINGE (ML) INTRAVENOUS AS NEEDED
Status: DISCONTINUED | OUTPATIENT
Start: 2023-11-09 | End: 2023-11-09

## 2023-11-09 RX ORDER — SODIUM CHLORIDE 0.9 % (FLUSH) 0.9 %
10 SYRINGE (ML) INJECTION EVERY 12 HOURS
Status: CANCELLED | OUTPATIENT
Start: 2023-11-09

## 2023-11-09 RX ORDER — SODIUM CHLORIDE 0.9 % (FLUSH) 0.9 %
10 SYRINGE (ML) INJECTION AS NEEDED
Status: CANCELLED | OUTPATIENT
Start: 2023-11-09

## 2023-11-09 RX ORDER — SODIUM CHLORIDE, SODIUM LACTATE, POTASSIUM CHLORIDE, CALCIUM CHLORIDE 600; 310; 30; 20 MG/100ML; MG/100ML; MG/100ML; MG/100ML
20 INJECTION, SOLUTION INTRAVENOUS CONTINUOUS
Status: DISCONTINUED | OUTPATIENT
Start: 2023-11-09 | End: 2023-11-10 | Stop reason: HOSPADM

## 2023-11-09 RX ORDER — PROPOFOL 10 MG/ML
INJECTION, EMULSION INTRAVENOUS CONTINUOUS PRN
Status: DISCONTINUED | OUTPATIENT
Start: 2023-11-09 | End: 2023-11-09

## 2023-11-09 RX ADMIN — PROPOFOL 130 MCG/KG/MIN: 10 INJECTION, EMULSION INTRAVENOUS at 08:10

## 2023-11-09 RX ADMIN — LIDOCAINE HYDROCHLORIDE 35 MG: 20 INJECTION INTRAVENOUS at 08:09

## 2023-11-09 RX ADMIN — SODIUM CHLORIDE, POTASSIUM CHLORIDE, SODIUM LACTATE AND CALCIUM CHLORIDE 20 ML/HR: 600; 310; 30; 20 INJECTION, SOLUTION INTRAVENOUS at 07:41

## 2023-11-09 RX ADMIN — SODIUM CHLORIDE, POTASSIUM CHLORIDE, SODIUM LACTATE AND CALCIUM CHLORIDE: 600; 310; 30; 20 INJECTION, SOLUTION INTRAVENOUS at 08:05

## 2023-11-09 RX ADMIN — PROPOFOL 20 MG: 10 INJECTION, EMULSION INTRAVENOUS at 08:41

## 2023-11-09 RX ADMIN — PROPOFOL 20 MG: 10 INJECTION, EMULSION INTRAVENOUS at 08:11

## 2023-11-09 RX ADMIN — PROPOFOL 20 MG: 10 INJECTION, EMULSION INTRAVENOUS at 08:16

## 2023-11-09 RX ADMIN — PROPOFOL 19.7 MG: 10 INJECTION, EMULSION INTRAVENOUS at 08:38

## 2023-11-09 RX ADMIN — PROPOFOL 40 MG: 10 INJECTION, EMULSION INTRAVENOUS at 08:09

## 2023-11-09 SDOH — HEALTH STABILITY: MENTAL HEALTH: CURRENT SMOKER: 0

## 2023-11-09 ASSESSMENT — PAIN SCALES - GENERAL
PAINLEVEL_OUTOF10: 0 - NO PAIN

## 2023-11-09 ASSESSMENT — PAIN - FUNCTIONAL ASSESSMENT
PAIN_FUNCTIONAL_ASSESSMENT: 0-10

## 2023-11-09 NOTE — ANESTHESIA PREPROCEDURE EVALUATION
Patient: Argelia Clay    Procedure Information       Date/Time: 11/09/23 0800    Scheduled providers: Israel Silvestre MD    Procedure: COLONOSCOPY    Location: [facility]            Relevant Problems   Endocrine   (+) Gestational diabetes mellitus   (+) Multinodular thyroid      Neuro/Psych   (+) Panic attack      Hematology   (+) Anemia       Clinical information reviewed:   Tobacco  Allergies    Med Hx  Surg Hx   Fam Hx  Soc Hx        NPO Detail:  No data recorded     Physical Exam    Airway  Mallampati: II  TM distance: <3 FB  Neck ROM: full     Cardiovascular - normal exam  Rhythm: regular  Rate: normal     Dental - normal exam     Pulmonary - normal exam     Abdominal            Anesthesia Plan    ASA 2     MAC     The patient is not a current smoker.    intravenous induction   Anesthetic plan and risks discussed with patient.    Plan discussed with CAA.

## 2023-11-09 NOTE — H&P
History Of Present Illness  Argelia Clay is a 38 y.o. female presenting for colonoscopy      Past Medical History  No past medical history on file.    Surgical History  Past Surgical History:   Procedure Laterality Date    OTHER SURGICAL HISTORY  08/02/2022    No history of surgery        Social History  She reports that she has never smoked. She has never used smokeless tobacco. She reports that she does not drink alcohol and does not use drugs.    Family History  Family History   Problem Relation Name Age of Onset    No Known Problems Mother      No Known Problems Father          Allergies  Amoxicillin and Vancomycin    Review of Systems   All other systems reviewed and are negative.       Physical Exam  HENT:      Head: Normocephalic.   Eyes:      Pupils: Pupils are equal, round, and reactive to light.   Cardiovascular:      Rate and Rhythm: Normal rate.   Pulmonary:      Effort: Pulmonary effort is normal.   Abdominal:      General: Abdomen is flat. Bowel sounds are normal.      Palpations: Abdomen is soft.   Skin:     General: Skin is warm.   Neurological:      General: No focal deficit present.      Mental Status: She is alert.   Psychiatric:         Mood and Affect: Mood normal.          Last Recorded Vitals  There were no vitals taken for this visit.    Relevant Results             Assessment/Plan           Israel Silvestre MD

## 2023-11-09 NOTE — DISCHARGE INSTRUCTIONS

## 2023-11-09 NOTE — ANESTHESIA POSTPROCEDURE EVALUATION
Patient: Argelia Clay    Procedure Summary       Date: 11/09/23 Room / Location: [facility]    Anesthesia Start: 0800 Anesthesia Stop:     Procedure: COLONOSCOPY Diagnosis: Encounter for screening for malignant neoplasm of colon    Scheduled Providers: Israel Silvestre MD Responsible Provider: Triston Costa MD    Anesthesia Type: MAC ASA Status: 2            Anesthesia Type: MAC    Vitals Value Taken Time   /64 11/09/23 0849   Temp 36.3 11/09/23 0849   Pulse 76 11/09/23 0849   Resp 16 11/09/23 0849   SpO2 100 11/09/23 0849       Anesthesia Post Evaluation    No notable events documented.

## 2023-11-10 NOTE — PROGRESS NOTES
Breast Medical Oncology Clinic  Location: [facility]    Visit Type: Follow-up Visit    ONC History:     April 2022: Developed left bloody nipple dischargefter the delivery of her baby.    8/1/2022: Diagnostic mammogram and ultrasound showed extremely dense breasts, and an irregular mass with pleomorphic calcification in the central medial left breast at midline. An asymmetry was identified in the medial right breast at midline at posterior  depth, probably benign. Ultrasound characterized an irregular hypoechoic mass measuring 3.8 x 1.3 x 2.1 cm at 9:00, 2 cm FTN. Targeted ultrasound of the left axilla showed a borderline lymph node measuring 0.8 cm with borderline thickness of the cortex.      8/1/22: Ultrasound-guided biopsy of left breast 9:00, 2 cm FTN which showed grade 3 invasive ductal carcinoma ER+ 10% KY+ 10% HER2 3+.  An additional biopsy of the left breast subareolar region showed intraductal papilloma.  Left axillary node biopsy  was negative.     8/10/22: Genetic testing ([employer]) was negative.   8/18/22: Breast tumor conference with recommendations for neoadjuvant chemotherapy.   9/1/22: CT  CAP: 2.3 cm hypervascular mass along the medial aspect of the left breast with prominent L axillary LN with a fatty notch but thickened cortex. Subcentimeter ground glass nodule left lower lobe consistent with metastatic focus.     9/1/22: NM bone scan: No evidence of metastatic disease   9/13/22: Subcentimeter ground glass nodule in the superior aspect of the LLL without hypermetabolic activity. Hypermetabolic R thyroid nodule.    9/16/22: First TCHP, completed 12/30/22 2/9/23: L PM and SLNBx: no residual disease, ypT0N0   3/29/23: Total thyroidectomy   5/12/23: completed XRT   6/1/23: started tamoxifen  11/3/23: Completed adjuvant Herceptin/perjeta      Subjective: Interval History    Presents for follow-up visit. Reports that for the last month she has had new headaches and tingling sensation in her head. She  feels it is correlated with her tamoxifen. Denies visual changes, nausea/vomiting, neurological symptoms.     Denies weight loss, changes in the breast and/or chest wall, new aches or pains, changes in appetite or energy level.       GYN History/Pertinent Family history:    PMH: None  Surgical:   Family: No family history of malignancy  Social: No tobacco use, no ETOH use  Menarche 15. Premenopausal. , age at first birth 32. No HRT.    Social History  Argelia Clay  reports that she has never smoked. She has never used smokeless tobacco.  She  reports no history of alcohol use.  She  reports no history of drug use.    ROS:     Review of Systems   All other systems reviewed and are negative.       Physical Examination:    LMP 09/10/2022     Physical Exam  Vitals and nursing note reviewed.   Constitutional:       General: She is not in acute distress.     Appearance: Normal appearance. She is not toxic-appearing.   HENT:      Head: Normocephalic and atraumatic.      Mouth/Throat:      Pharynx: Oropharynx is clear.   Eyes:      Extraocular Movements: Extraocular movements intact.      Conjunctiva/sclera: Conjunctivae normal.   Cardiovascular:      Rate and Rhythm: Normal rate and regular rhythm.   Pulmonary:      Effort: Pulmonary effort is normal. No respiratory distress.   Abdominal:      General: Abdomen is flat. Bowel sounds are normal.      Palpations: Abdomen is soft.   Musculoskeletal:         General: No swelling. Normal range of motion.      Cervical back: Normal range of motion.   Skin:     General: Skin is warm and dry.   Neurological:      General: No focal deficit present.      Mental Status: She is alert.   Psychiatric:         Mood and Affect: Mood normal.       Breast Examination:    No masses, skin or nipple changes in either breast.     ECOG Performance Status:     [x] 0 Fully active, able to carry on all pre-disease performance without restriction  [] 1 Restricted in physically strenuous  activity but ambulatory and able to carry out work of a light or sedentary nature, e.g., light house work, office work  [] 2 Ambulatory and capable of all selfcare but unable to carry out any work activities; up and about more than 50% of waking hours  [] 3 Capable of only limited selfcare; confined to bed or chair more than 50% of waking hours  [] 4 Completely disabled; cannot carry on any selfcare; totally confined to bed or chair  [] 5 Dead     Results:    Labs:  No new pertinent results since last visit    Imaging:  Reviewed 9/15/23 mammogram    Pathology:  No new pertinent results since last visit    Assessment:     Clinical prognostic stage IA (cT2 cN0 cM0) invasive ductal carcinoma of the L breast; Dx in August 2022; Grade 3; ER positive (10%), RI positive (10%), HER2 positive.  S/p neoadjuvant TCHP X6 with great response. S/p L PM and SLNbx, ypT0N0. Completed maintenance herceptin/perjeta, total one year. Every 3 month lupron and tamoxifen.     Argelia is a very pleasant pre-menopausal woman with newly diagnosed breast cancer with no significant past medical history. New onset headaches, will obtain MRI brain. Experiencing side effects from tamoxifen including hypertriglyceridemia. She is appropriately ovarian function suppressed, will transition to anastrozole after 3 week washout from tamoxifen.       Plan:    Surgical Plan: S/p L PM and SLNbx, ypT0N0.     Additional biopsy: Not indicated  Radiation Plan: Completed with Dr. Munoz  Additional staging scans/DEXA/echo: Pre-chemotherapy staging scans reviewed with patient. No definitive evidence of metastatic disease. Subcentimeter LLL nodule not hypermetabolic on PET, stable on  f/u CT chest. Hypermetabolic nodule on thyroid, found papillary thyroid cancer. She had  echocardiogram every 3 months while receiving HER2 directed therapy.   Additional Path info (i.e Ki67, PDL1): Not indicated  Gene assays: Not indicated     Systemic treatment plan: Patient is  experiencing side effects from tamoxifen. She has been receiving OFS with every 3 month Leuprolide. Will transition from tamoxifen to anastrozole.                    Intent: Curative                Clinical trial: N/A                Endocrine therapy: As above                HER2 treatment: Completed one year of trastuzumab/pertuzumab                Targeted agents: Not indicated                Chemotherapy: S/p [id number]                BMA: Discussed, she reports she needs lots of dental work.      Access: Referral for port removal  Supportive meds: None this visit  Genetic testing: Negative testing  Fertility preservation: Patient declined, reports no further plans for child bearing  Other active problems/orders:       Papillary thyroid carcinoma- s/p total thyroidectomy   Mucous in bowel movements: discussed monitoring this finding with patient however she reports she is very concerned and prefers to be evaluated by gastroenterology. Referral in place.    Sub-centimeter nodule non-hypermetabolic on PET: Stable on imaging from 9/22 to 9/23. No further follow-up needed  New onset headaches: in setting of HER2+ breast cancer, will obtain brain imaging STAT     Surveillance plan: Yearly mammogram    Follow-up: 3 months    Patient expressed understanding of the plan outlined above. She had ample time to ask questions. She understands she can contact us should she have additional questions or issues arise in the interim.

## 2023-11-14 ENCOUNTER — OFFICE VISIT (OUTPATIENT)
Dept: HEMATOLOGY/ONCOLOGY | Facility: CLINIC | Age: 38
End: 2023-11-14
Payer: COMMERCIAL

## 2023-11-14 VITALS
BODY MASS INDEX: 17.98 KG/M2 | WEIGHT: 95 LBS | HEART RATE: 84 BPM | DIASTOLIC BLOOD PRESSURE: 72 MMHG | SYSTOLIC BLOOD PRESSURE: 108 MMHG

## 2023-11-14 DIAGNOSIS — C50.912 INVASIVE DUCTAL CARCINOMA OF LEFT BREAST (MULTI): Primary | ICD-10-CM

## 2023-11-14 DIAGNOSIS — C50.919 MALIGNANT NEOPLASM OF BREAST IN FEMALE, ESTROGEN RECEPTOR POSITIVE, UNSPECIFIED LATERALITY, UNSPECIFIED SITE OF BREAST (MULTI): ICD-10-CM

## 2023-11-14 DIAGNOSIS — Z17.0 MALIGNANT NEOPLASM OF BREAST IN FEMALE, ESTROGEN RECEPTOR POSITIVE, UNSPECIFIED LATERALITY, UNSPECIFIED SITE OF BREAST (MULTI): ICD-10-CM

## 2023-11-14 PROCEDURE — 3078F DIAST BP <80 MM HG: CPT | Performed by: STUDENT IN AN ORGANIZED HEALTH CARE EDUCATION/TRAINING PROGRAM

## 2023-11-14 PROCEDURE — 3074F SYST BP LT 130 MM HG: CPT | Performed by: STUDENT IN AN ORGANIZED HEALTH CARE EDUCATION/TRAINING PROGRAM

## 2023-11-14 PROCEDURE — 3048F LDL-C <100 MG/DL: CPT | Performed by: STUDENT IN AN ORGANIZED HEALTH CARE EDUCATION/TRAINING PROGRAM

## 2023-11-14 PROCEDURE — 3008F BODY MASS INDEX DOCD: CPT | Performed by: STUDENT IN AN ORGANIZED HEALTH CARE EDUCATION/TRAINING PROGRAM

## 2023-11-14 PROCEDURE — 99215 OFFICE O/P EST HI 40 MIN: CPT | Performed by: STUDENT IN AN ORGANIZED HEALTH CARE EDUCATION/TRAINING PROGRAM

## 2023-11-14 PROCEDURE — 1036F TOBACCO NON-USER: CPT | Performed by: STUDENT IN AN ORGANIZED HEALTH CARE EDUCATION/TRAINING PROGRAM

## 2023-11-14 PROCEDURE — 3044F HG A1C LEVEL LT 7.0%: CPT | Performed by: STUDENT IN AN ORGANIZED HEALTH CARE EDUCATION/TRAINING PROGRAM

## 2023-11-14 RX ORDER — ANASTROZOLE 1 MG/1
1 TABLET ORAL DAILY
Qty: 90 TABLET | Refills: 1 | Status: SHIPPED | OUTPATIENT
Start: 2023-11-14 | End: 2024-05-24 | Stop reason: SDUPTHER

## 2023-11-14 ASSESSMENT — PAIN SCALES - GENERAL: PAINLEVEL: 0-NO PAIN

## 2023-11-15 DIAGNOSIS — C50.912 INVASIVE DUCTAL CARCINOMA OF LEFT BREAST (MULTI): ICD-10-CM

## 2023-11-16 ENCOUNTER — OFFICE VISIT (OUTPATIENT)
Dept: DERMATOLOGY | Facility: CLINIC | Age: 38
End: 2023-11-16
Payer: COMMERCIAL

## 2023-11-16 DIAGNOSIS — L91.0 HYPERTROPHIC SCAR: ICD-10-CM

## 2023-11-16 DIAGNOSIS — L30.8 OTHER SPECIFIED DERMATITIS: ICD-10-CM

## 2023-11-16 DIAGNOSIS — L01.00 IMPETIGO: Primary | ICD-10-CM

## 2023-11-16 PROCEDURE — 3008F BODY MASS INDEX DOCD: CPT | Performed by: DERMATOLOGY

## 2023-11-16 PROCEDURE — 1036F TOBACCO NON-USER: CPT | Performed by: DERMATOLOGY

## 2023-11-16 PROCEDURE — 87075 CULTR BACTERIA EXCEPT BLOOD: CPT | Performed by: DERMATOLOGY

## 2023-11-16 PROCEDURE — 99204 OFFICE O/P NEW MOD 45 MIN: CPT | Performed by: DERMATOLOGY

## 2023-11-16 PROCEDURE — 3048F LDL-C <100 MG/DL: CPT | Performed by: DERMATOLOGY

## 2023-11-16 PROCEDURE — 3044F HG A1C LEVEL LT 7.0%: CPT | Performed by: DERMATOLOGY

## 2023-11-16 RX ORDER — DIPHENHYDRAMINE HYDROCHLORIDE 50 MG/ML
50 INJECTION INTRAMUSCULAR; INTRAVENOUS AS NEEDED
Status: CANCELLED | OUTPATIENT
Start: 2023-12-15

## 2023-11-16 RX ORDER — DOXYCYCLINE 100 MG/1
100 CAPSULE ORAL 2 TIMES DAILY
Qty: 20 CAPSULE | Refills: 0 | Status: SHIPPED | OUTPATIENT
Start: 2023-11-16 | End: 2023-11-26

## 2023-11-16 RX ORDER — TRIAMCINOLONE ACETONIDE 1 MG/G
OINTMENT TOPICAL
Qty: 80 G | Refills: 1 | Status: SHIPPED | OUTPATIENT
Start: 2023-11-16 | End: 2024-01-17 | Stop reason: WASHOUT

## 2023-11-16 RX ORDER — FAMOTIDINE 10 MG/ML
20 INJECTION INTRAVENOUS ONCE AS NEEDED
Status: CANCELLED | OUTPATIENT
Start: 2023-12-15

## 2023-11-16 RX ORDER — EPINEPHRINE 0.3 MG/.3ML
0.3 INJECTION SUBCUTANEOUS EVERY 5 MIN PRN
Status: CANCELLED | OUTPATIENT
Start: 2023-12-15

## 2023-11-16 RX ORDER — ALBUTEROL SULFATE 0.83 MG/ML
3 SOLUTION RESPIRATORY (INHALATION) AS NEEDED
Status: CANCELLED | OUTPATIENT
Start: 2023-12-15

## 2023-11-16 ASSESSMENT — DERMATOLOGY QUALITY OF LIFE (QOL) ASSESSMENT
RATE HOW EMOTIONALLY BOTHERED YOU ARE BY YOUR SKIN PROBLEM (FOR EXAMPLE, WORRY, EMBARRASSMENT, FRUSTRATION): 6 - ALWAYS BOTHERED
WHAT SINGLE SKIN CONDITION LISTED BELOW IS THE PATIENT ANSWERING THE QUALITY-OF-LIFE ASSESSMENT QUESTIONS ABOUT: NONE OF THE ABOVE
RATE HOW BOTHERED YOU ARE BY EFFECTS OF YOUR SKIN PROBLEMS ON YOUR ACTIVITIES (EG, GOING OUT, ACCOMPLISHING WHAT YOU WANT, WORK ACTIVITIES OR YOUR RELATIONSHIPS WITH OTHERS): 6 - ALWAYS BOTHERED
ARE THERE EXCLUSIONS OR EXCEPTIONS FOR THE QUALITY OF LIFE ASSESSMENT: NO
RATE HOW BOTHERED YOU ARE BY SYMPTOMS OF YOUR SKIN PROBLEM (EG, ITCHING, STINGING BURNING, HURTING OR SKIN IRRITATION): 6 - ALWAYS BOTHERED

## 2023-11-16 ASSESSMENT — DERMATOLOGY PATIENT ASSESSMENT
ARE YOU TRYING TO GET PREGNANT: NO
ARE YOU AN ORGAN TRANSPLANT RECIPIENT: NO
DO YOU USE SUNSCREEN: OCCASIONALLY
DO YOU USE A TANNING BED: NO
DO YOU HAVE ANY NEW OR CHANGING LESIONS: YES

## 2023-11-16 ASSESSMENT — ITCH NUMERIC RATING SCALE: HOW SEVERE IS YOUR ITCHING?: 3

## 2023-11-16 ASSESSMENT — PATIENT GLOBAL ASSESSMENT (PGA): PATIENT GLOBAL ASSESSMENT: PATIENT GLOBAL ASSESSMENT:  3 - MODERATE

## 2023-11-16 NOTE — PROGRESS NOTES
Subjective     Argelia Clay is a 38 y.o. female who presents for the following: Suspicious Skin Lesion (Pt has lesion right inframammary crease. Rubs on bra and bleeds. Has had lesion for a while.) and Rash (Pt had rash/infection on right dorsal foot x2 month. Rash is itchy, yellow colored crust, sock will stick to area. No hx of eczema or psoriasis. Pt has used Hydrocortisone otc on rash. ).   Biopsy performed by [facility] which showed ulcerated pyogenic granuloma.  She does have a history of invasive ductal carcinoma with negative SLN s/p surgery and chemo and was on tamoxifen however per recent heme onc note is transitioning to anastrozole due to onset of headaches and is awaiting brain MRI.  She reports till lesion at biopsy site and concerned about it.  Also has weeping and crusting of the foot. Denies history of eczema, however when prompted she states she has had a rash since she was previously on chemotherapy.      Review of Systems:  No other skin or systemic complaints other than what is documented elsewhere in the note.    The following portions of the chart were reviewed this encounter and updated as appropriate:          Skin Cancer History  No skin cancer on file.      Specialty Problems          Dermatology Problems    Skin lesion    SCC (squamous cell carcinoma)     SCC SERIES             Objective   Well appearing patient in no apparent distress; mood and affect are within normal limits.    A focused skin examination was performed of the abdomen, bilateral forearms, right dorsal foot. All findings within normal limits unless otherwise noted below.    Assessment/Plan   1. Impetigo  Right dorsal foot  Erythematous crusted plaque with weeping    Likely colonized by staph vs. Strep given crust with underlying eczematous process/disrupted barrier resulting in superinfection.  Culture obtained  Start doxycycline 100mg 2x daily with food and water x 10 days due to amoxicillin allergy  Side effects of  oral doxycycline were reviewed including upset stomach, indigestion, heartburn, rash, increased photosensitivity. Patient advised to take medication with non-dairy food and water, not lie flat for 30-45 minutes after taking medication and to practice sun protective behaviors. Patient to call should they experience side effect or concern with medication. Patient aware to avoid pregnancy while on medication.    doxycycline (Vibramycin) 100 mg capsule - Right dorsal foot  Take 1 capsule (100 mg) by mouth 2 times a day for 10 days. Take with at least 8 ounces (large glass) of water, do not lie down for 30 minutes after    Specimen A - Tissue/Wound Culture/Smear  Differential Diagnosis: impetigo  Check Margins Yes/No?:    Comments:    Dermpath Lab: N/A    2. Other specified dermatitis  Left Forearm - Anterior, Right Foot - Anterior, Right Forearm - Anterior  Erythematous scaly patches and plaques    This is a common eczema (dermatitis) seen most frequently on the legs, however may occur anywhere on the body.  The goal of treatment is to restore the skin barrier and decrease inflammation and itching.  Treatments include topical corticosteroid therapy when the skin is red, itchy and flaky.     To prevent severity and frequency of recurrences a gentle skin care regimen should be followed which includes washing with a fragrance-free soap such as Dove for sensitive skin, Cetaphil or Cerave body washes. After rinsing, pat dry and apply a moisturizer such as Cerave, Cetaphil, or Vanicream. Creams are thicker than lotions and often provde better moisturization than lotions.    Start triamcinolone 0.1% ointment  Patient to apply 2x daily x 2 wks then decrease to 2x/day 2 days per week. Can repeat full 2 week course as often as every 6-8 weeks as needed for flaring. Side effects of topical steroids includes, but is not limited to skin atrophy and dyspigmentation.    Follow up if worsening    Related Medications  triamcinolone  (Kenalog) 0.1 % ointment  Apply to arms, feet 2x daily x  2weeks    3. Hypertrophic scar  Right Abdomen (side) - Upper  Pink thickened 5mm papule at biopsy site    Records reviewed which showed pyogenic granuloma  No evidence of recurrence  No treatment necessary as it is asymptomatic

## 2023-11-17 ENCOUNTER — APPOINTMENT (OUTPATIENT)
Dept: HEMATOLOGY/ONCOLOGY | Facility: CLINIC | Age: 38
End: 2023-11-17
Payer: COMMERCIAL

## 2023-11-20 LAB
BACTERIA SPEC CULT: ABNORMAL
BACTERIA SPEC CULT: ABNORMAL
GRAM STN SPEC: ABNORMAL
GRAM STN SPEC: ABNORMAL

## 2023-11-21 ENCOUNTER — TELEPHONE (OUTPATIENT)
Dept: HEMATOLOGY/ONCOLOGY | Facility: HOSPITAL | Age: 38
End: 2023-11-21
Payer: COMMERCIAL

## 2023-11-21 LAB
LABORATORY COMMENT REPORT: NORMAL
PATH REPORT.FINAL DX SPEC: NORMAL
PATH REPORT.GROSS SPEC: NORMAL
PATH REPORT.RELEVANT HX SPEC: NORMAL
PATH REPORT.TOTAL CANCER: NORMAL

## 2023-11-21 NOTE — TELEPHONE ENCOUNTER
RN spoke with patient and informed her that it was okay for her to receive her covid shot per Dr. Alvarenga. Patient verbalized understanding and had no other questions at this time

## 2023-11-21 NOTE — ADDENDUM NOTE
Encounter addended by: Israel Silvestre MD on: 11/21/2023 6:15 PM   Actions taken: Order Reconciliation Section accessed

## 2023-11-24 ENCOUNTER — APPOINTMENT (OUTPATIENT)
Dept: HEMATOLOGY/ONCOLOGY | Facility: CLINIC | Age: 38
End: 2023-11-24
Payer: COMMERCIAL

## 2023-11-27 NOTE — RESULT ENCOUNTER NOTE
Culture is positive for staph infection - sensitive to oral antibiotics that patient was prescribed (doxycycline). If worsening should call office

## 2023-11-27 NOTE — RESULT ENCOUNTER NOTE
Patient has been informed of result. She states that she has been doing better and that her issues have resolved. She will call the office with any issues or concerns in the interim.

## 2023-11-30 ENCOUNTER — ANCILLARY PROCEDURE (OUTPATIENT)
Dept: RADIOLOGY | Facility: CLINIC | Age: 38
End: 2023-11-30
Payer: COMMERCIAL

## 2023-11-30 DIAGNOSIS — C50.912 INVASIVE DUCTAL CARCINOMA OF LEFT BREAST (MULTI): ICD-10-CM

## 2023-11-30 PROCEDURE — 2550000001 HC RX 255 CONTRASTS: Performed by: STUDENT IN AN ORGANIZED HEALTH CARE EDUCATION/TRAINING PROGRAM

## 2023-11-30 PROCEDURE — 70553 MRI BRAIN STEM W/O & W/DYE: CPT | Performed by: RADIOLOGY

## 2023-11-30 PROCEDURE — A9575 INJ GADOTERATE MEGLUMI 0.1ML: HCPCS | Performed by: STUDENT IN AN ORGANIZED HEALTH CARE EDUCATION/TRAINING PROGRAM

## 2023-11-30 PROCEDURE — 70553 MRI BRAIN STEM W/O & W/DYE: CPT

## 2023-11-30 RX ORDER — GADOTERATE MEGLUMINE 376.9 MG/ML
9 INJECTION INTRAVENOUS
Status: COMPLETED | OUTPATIENT
Start: 2023-11-30 | End: 2023-11-30

## 2023-11-30 RX ADMIN — GADOTERATE MEGLUMINE 9 ML: 376.9 INJECTION INTRAVENOUS at 14:16

## 2023-12-01 ENCOUNTER — TELEPHONE (OUTPATIENT)
Dept: HEMATOLOGY/ONCOLOGY | Facility: CLINIC | Age: 38
End: 2023-12-01
Payer: COMMERCIAL

## 2023-12-01 NOTE — TELEPHONE ENCOUNTER
Called patient to review brain MRI- no evidence of metastatic disease. Patient appreciative of the call.

## 2023-12-07 ENCOUNTER — HOSPITAL ENCOUNTER (OUTPATIENT)
Dept: CARDIOLOGY | Facility: HOSPITAL | Age: 38
Discharge: HOME | End: 2023-12-07
Payer: COMMERCIAL

## 2023-12-07 VITALS
OXYGEN SATURATION: 97 % | RESPIRATION RATE: 17 BRPM | HEART RATE: 72 BPM | TEMPERATURE: 97.3 F | SYSTOLIC BLOOD PRESSURE: 102 MMHG | DIASTOLIC BLOOD PRESSURE: 64 MMHG

## 2023-12-07 DIAGNOSIS — C50.912 INVASIVE DUCTAL CARCINOMA OF LEFT BREAST (MULTI): Primary | ICD-10-CM

## 2023-12-07 PROCEDURE — 99152 MOD SED SAME PHYS/QHP 5/>YRS: CPT | Performed by: RADIOLOGY

## 2023-12-07 PROCEDURE — 36590 REMOVAL TUNNELED CV CATH: CPT | Performed by: RADIOLOGY

## 2023-12-07 PROCEDURE — 99204 OFFICE O/P NEW MOD 45 MIN: CPT | Performed by: PHYSICIAN ASSISTANT

## 2023-12-07 PROCEDURE — 2500000005 HC RX 250 GENERAL PHARMACY W/O HCPCS: Performed by: RADIOLOGY

## 2023-12-07 PROCEDURE — 2500000004 HC RX 250 GENERAL PHARMACY W/ HCPCS (ALT 636 FOR OP/ED): Performed by: RADIOLOGY

## 2023-12-07 PROCEDURE — 2720000007 HC OR 272 NO HCPCS: Performed by: RADIOLOGY

## 2023-12-07 PROCEDURE — 99153 MOD SED SAME PHYS/QHP EA: CPT | Performed by: RADIOLOGY

## 2023-12-07 RX ORDER — MIDAZOLAM HYDROCHLORIDE 1 MG/ML
INJECTION, SOLUTION INTRAMUSCULAR; INTRAVENOUS AS NEEDED
Status: DISCONTINUED | OUTPATIENT
Start: 2023-12-07 | End: 2023-12-08 | Stop reason: HOSPADM

## 2023-12-07 RX ORDER — LIDOCAINE HYDROCHLORIDE 20 MG/ML
INJECTION, SOLUTION INFILTRATION; PERINEURAL AS NEEDED
Status: DISCONTINUED | OUTPATIENT
Start: 2023-12-07 | End: 2023-12-08 | Stop reason: HOSPADM

## 2023-12-07 RX ORDER — FENTANYL CITRATE 50 UG/ML
INJECTION, SOLUTION INTRAMUSCULAR; INTRAVENOUS AS NEEDED
Status: DISCONTINUED | OUTPATIENT
Start: 2023-12-07 | End: 2023-12-08 | Stop reason: HOSPADM

## 2023-12-07 RX ORDER — SODIUM CHLORIDE 9 MG/ML
100 INJECTION, SOLUTION INTRAVENOUS CONTINUOUS
Status: DISCONTINUED | OUTPATIENT
Start: 2023-12-07 | End: 2023-12-07

## 2023-12-07 RX ADMIN — LIDOCAINE HYDROCHLORIDE 10 ML: 20 INJECTION, SOLUTION INFILTRATION; PERINEURAL at 10:21

## 2023-12-07 RX ADMIN — FENTANYL CITRATE 50 MCG: 50 INJECTION, SOLUTION INTRAMUSCULAR; INTRAVENOUS at 10:18

## 2023-12-07 RX ADMIN — MIDAZOLAM HYDROCHLORIDE 1 MG: 1 INJECTION, SOLUTION INTRAMUSCULAR; INTRAVENOUS at 10:18

## 2023-12-07 ASSESSMENT — PAIN - FUNCTIONAL ASSESSMENT
PAIN_FUNCTIONAL_ASSESSMENT: 0-10
PAIN_FUNCTIONAL_ASSESSMENT: 0-10

## 2023-12-07 ASSESSMENT — PAIN SCALES - GENERAL: PAINLEVEL_OUTOF10: 2

## 2023-12-07 NOTE — H&P
History Of Present Illness  Argelia Clay is a 38 y.o. female presenting for Mediport removal with Dr Bermudez.     Past Medical History  Past Medical History:   Diagnosis Date    Hyperlipidemia        Surgical History  Past Surgical History:   Procedure Laterality Date    OTHER SURGICAL HISTORY  08/02/2022    No history of surgery        Social History  She reports that she has never smoked. She has never used smokeless tobacco. She reports that she does not drink alcohol and does not use drugs.    Family History  Family History   Problem Relation Name Age of Onset    No Known Problems Mother      No Known Problems Father          Allergies  Amoxicillin and Vancomycin    Review of Systems   Hematological:         H/o Thyroid cancer   All other systems reviewed and are negative.       Physical Exam  Vitals reviewed.   HENT:      Head: Normocephalic.      Mouth/Throat:      Mouth: Mucous membranes are moist.      Comments: Mallampati II  Eyes:      Pupils: Pupils are equal, round, and reactive to light.   Cardiovascular:      Rate and Rhythm: Normal rate.   Pulmonary:      Effort: Pulmonary effort is normal.      Breath sounds: Normal breath sounds.   Abdominal:      Palpations: Abdomen is soft.   Musculoskeletal:         General: Normal range of motion.      Cervical back: Normal range of motion and neck supple.   Skin:     General: Skin is warm and dry.   Neurological:      General: No focal deficit present.      Mental Status: She is alert.   Psychiatric:         Mood and Affect: Mood normal.          Last Recorded Vitals  Blood pressure 122/68, pulse 68, temperature 36 °C (96.8 °F), temperature source Tympanic, resp. rate 18, last menstrual period 09/10/2022.    Relevant Results  Current Outpatient Medications on File Prior to Encounter   Medication Sig Dispense Refill    anastrozole (Arimidex) 1 mg tablet Take 1 tablet (1 mg total) by mouth once daily.  Swallow whole with a drink of water. 90 tablet 1    atorvastatin  (Lipitor) 20 mg tablet Take 1 tablet (20 mg) by mouth once daily.      ferrous sulfate 325 (65 Fe) MG tablet Take 1 tablet by mouth 2 times a day with meals.      fish oil concentrate (Omega-3) 120-180 mg capsule Fish Oil 1000 MG Oral Capsule   Refills: 0        Start : 1-Aug-2022   Active      garlic (garlic oiL) 1,000 mg capsule Garlic Oil 1000 MG Oral Capsule   Refills: 0        Start : 1-Aug-2022   Active      levothyroxine (Synthroid, Levoxyl) 75 mcg tablet Take 1 tablet (75 mcg) by mouth once daily.      multivitamin (Multiple Vitamins) tablet Multiple Vitamins Oral Tablet   Refills: 0        Start : 1-Aug-2022   Active      multivitamin with minerals tablet Take 1 tablet by mouth once daily.      nystatin (Mycostatin) 100,000 unit/mL suspension Take 4 mL (400,000 Units) by mouth 4 times a day.      omega 3-dha-epa-fish oil (Fish OiL) 1,000 mg (120 mg-180 mg) capsule 1 capsule (1,000 mg).      SIMETHICONE ORAL Take 1 capsule by mouth 4 times a day as needed.      triamcinolone (Kenalog) 0.1 % ointment Apply to arms, feet 2x daily x  2weeks 80 g 1     No current facility-administered medications on file prior to encounter.     No results found for this or any previous visit (from the past 24 hour(s)).    Assessment/Plan   Mediport removal with Dr Lara VALENCIA spent 15 minutes in the professional and overall care of this patient.      Marie He PA-C

## 2023-12-15 ENCOUNTER — INFUSION (OUTPATIENT)
Dept: HEMATOLOGY/ONCOLOGY | Facility: CLINIC | Age: 38
End: 2023-12-15
Payer: COMMERCIAL

## 2023-12-15 VITALS
HEART RATE: 81 BPM | SYSTOLIC BLOOD PRESSURE: 111 MMHG | HEIGHT: 60 IN | TEMPERATURE: 98.8 F | OXYGEN SATURATION: 98 % | WEIGHT: 94.8 LBS | DIASTOLIC BLOOD PRESSURE: 63 MMHG | RESPIRATION RATE: 16 BRPM | BODY MASS INDEX: 18.61 KG/M2

## 2023-12-15 DIAGNOSIS — Z17.0 MALIGNANT NEOPLASM OF BREAST IN FEMALE, ESTROGEN RECEPTOR POSITIVE, UNSPECIFIED LATERALITY, UNSPECIFIED SITE OF BREAST (MULTI): ICD-10-CM

## 2023-12-15 DIAGNOSIS — C50.919 MALIGNANT NEOPLASM OF BREAST IN FEMALE, ESTROGEN RECEPTOR POSITIVE, UNSPECIFIED LATERALITY, UNSPECIFIED SITE OF BREAST (MULTI): ICD-10-CM

## 2023-12-15 PROCEDURE — 2500000004 HC RX 250 GENERAL PHARMACY W/ HCPCS (ALT 636 FOR OP/ED): Mod: JZ | Performed by: STUDENT IN AN ORGANIZED HEALTH CARE EDUCATION/TRAINING PROGRAM

## 2023-12-15 PROCEDURE — 96372 THER/PROPH/DIAG INJ SC/IM: CPT

## 2023-12-15 RX ORDER — FAMOTIDINE 10 MG/ML
20 INJECTION INTRAVENOUS ONCE AS NEEDED
Status: CANCELLED | OUTPATIENT
Start: 2024-03-08

## 2023-12-15 RX ORDER — EPINEPHRINE 0.3 MG/.3ML
0.3 INJECTION SUBCUTANEOUS EVERY 5 MIN PRN
Status: CANCELLED | OUTPATIENT
Start: 2024-03-08

## 2023-12-15 RX ORDER — ALBUTEROL SULFATE 0.83 MG/ML
3 SOLUTION RESPIRATORY (INHALATION) AS NEEDED
Status: CANCELLED | OUTPATIENT
Start: 2024-03-08

## 2023-12-15 RX ORDER — DIPHENHYDRAMINE HYDROCHLORIDE 50 MG/ML
50 INJECTION INTRAMUSCULAR; INTRAVENOUS AS NEEDED
Status: CANCELLED | OUTPATIENT
Start: 2024-03-08

## 2023-12-15 RX ADMIN — LEUPROLIDE ACETATE 11.25 MG: KIT at 11:13

## 2023-12-15 ASSESSMENT — PAIN SCALES - GENERAL: PAINLEVEL: 0-NO PAIN

## 2023-12-22 ENCOUNTER — HOSPITAL ENCOUNTER (OUTPATIENT)
Dept: RADIATION ONCOLOGY | Facility: CLINIC | Age: 38
Setting detail: RADIATION/ONCOLOGY SERIES
Discharge: HOME | End: 2023-12-22
Payer: COMMERCIAL

## 2023-12-22 VITALS
DIASTOLIC BLOOD PRESSURE: 77 MMHG | RESPIRATION RATE: 18 BRPM | WEIGHT: 95.2 LBS | BODY MASS INDEX: 18.59 KG/M2 | HEART RATE: 79 BPM | TEMPERATURE: 98.6 F | OXYGEN SATURATION: 100 % | SYSTOLIC BLOOD PRESSURE: 114 MMHG

## 2023-12-22 DIAGNOSIS — C50.912 INVASIVE DUCTAL CARCINOMA OF LEFT BREAST (MULTI): Primary | ICD-10-CM

## 2023-12-22 PROCEDURE — 99213 OFFICE O/P EST LOW 20 MIN: CPT | Performed by: STUDENT IN AN ORGANIZED HEALTH CARE EDUCATION/TRAINING PROGRAM

## 2023-12-22 ASSESSMENT — ENCOUNTER SYMPTOMS
RESPIRATORY NEGATIVE: 1
EYES NEGATIVE: 1
CONSTITUTIONAL NEGATIVE: 1
HOT FLASHES: 1
BACK PAIN: 1
CARDIOVASCULAR NEGATIVE: 1
HEMATOLOGIC/LYMPHATIC NEGATIVE: 1
PSYCHIATRIC NEGATIVE: 1
NEUROLOGICAL NEGATIVE: 1
GASTROINTESTINAL NEGATIVE: 1

## 2023-12-22 ASSESSMENT — PATIENT HEALTH QUESTIONNAIRE - PHQ9
2. FEELING DOWN, DEPRESSED OR HOPELESS: NOT AT ALL
1. LITTLE INTEREST OR PLEASURE IN DOING THINGS: NOT AT ALL
SUM OF ALL RESPONSES TO PHQ9 QUESTIONS 1 AND 2: 0

## 2023-12-22 ASSESSMENT — COLUMBIA-SUICIDE SEVERITY RATING SCALE - C-SSRS
6. HAVE YOU EVER DONE ANYTHING, STARTED TO DO ANYTHING, OR PREPARED TO DO ANYTHING TO END YOUR LIFE?: NO
1. IN THE PAST MONTH, HAVE YOU WISHED YOU WERE DEAD OR WISHED YOU COULD GO TO SLEEP AND NOT WAKE UP?: NO
2. HAVE YOU ACTUALLY HAD ANY THOUGHTS OF KILLING YOURSELF?: NO

## 2023-12-22 ASSESSMENT — PAIN SCALES - GENERAL: PAINLEVEL: 0-NO PAIN

## 2023-12-22 NOTE — PROGRESS NOTES
Radiation Oncology Follow-Up    Patient Name:  Argelia Clay  MRN:  [id number]  :  1985    Referring Provider: Lizeth Nicole MD  Primary Care Provider: Larry Alvarenga MD  Care Team: Patient Care Team:  Larry Alvarenga MD as PCP - General (Hematology and Oncology)  Larry Alvarenga MD as Consulting Physician (Hematology and Oncology)    Date of Service: 2023     SUBJECTIVE  History of Present Illness:   Argelia Clay is a 38 y.o. female with triple positive invasive ductal carcinoma of the left breast, [id number]  -> ypT0N0 s/p leticia-adjuvant TCHP, left breast lumpectomy and SLNB. S/p adjuvant RT left breast with a lumpectomy bed boost to 52.56 Gy in 20 fx, completed 23, and herceptin/perjeta. Synchronous diagnosis of papillary thyroid carcinoma s/p thyroidectomy. On anastrazole.   Returning today for follow-up. Her last mammograms on 9/15/2023 were BI-RADS 2 benign with posttreatment changes in the left breast.  She is scheduled for next imaging in 2024.  She saw Dr. Alvarenga in 2023, obtained brain MRI due to new onset headaches, no evidence of brain metastases seen. Recently switched from taxomifen to anastrozole.  She continues to follow with cardiology for posttreatment monitoring and for episodic palpitations that had been occurring since prior to her radiotherapy course. Last echo showed normal EF.   She notes firmness in her medial breast and under the nipple, and can feel breast tenderness when she presses on her breast.  She also notes occasional pains in her axilla.  She is not taking any medications for this.  She notes mild fatigue.  She has been noting pains in her back since starting anastrozole, and she has discussed this with her oncologist.    Review of Systems:   Review of Systems   Constitutional: Negative.    HENT:  Negative.     Eyes: Negative.    Respiratory: Negative.     Cardiovascular: Negative.    Gastrointestinal: Negative.    Endocrine: Positive for hot  flashes.   Genitourinary: Negative.     Musculoskeletal:  Positive for back pain.   Skin: Negative.    Neurological: Negative.    Hematological: Negative.    Psychiatric/Behavioral: Negative.       The patient's current pain level was assessed.  They report currently having a pain of 0 out of 10.  They feel their pain is under control without the use of pain medications.    Performance Status:   The Karnofsky performance scale today is 90, Able to carry on normal activity; minor signs or symptoms of disease (ECOG equivalent 0).        OBJECTIVE  Vital Signs:  /77   Pulse 79   Temp 37 °C (98.6 °F)   Resp 18   Wt (!) 43.2 kg (95 lb 3.2 oz)   SpO2 100%   BMI 18.59 kg/m²    Physical Exam  Vitals reviewed.   Constitutional:       General: She is not in acute distress.  HENT:      Head: Normocephalic and atraumatic.   Cardiovascular:      Rate and Rhythm: Normal rate and regular rhythm.      Heart sounds: No murmur heard.  Pulmonary:      Effort: Pulmonary effort is normal. No respiratory distress.      Breath sounds: No wheezing.   Chest:          Comments: No palpable breast masses. Mild hyperpigmentation of the left breast c/w post-radiation change. Linear fibrosis underneath the lumpectomy scar.   Musculoskeletal:      Comments:   Good range of motion at the shoulders without upper extremity lymphedema   Lymphadenopathy:      Upper Body:      Right upper body: No axillary adenopathy.      Left upper body: No axillary adenopathy.   Neurological:      Mental Status: She is alert and oriented to person, place, and time.   Psychiatric:         Mood and Affect: Mood normal.         Behavior: Behavior normal.           ASSESSMENT:  Argelia Clay is a 38 y.o. female with triple positive invasive ductal carcinoma of the left breast, [id number]  -> ypT0N0 s/p leticia-adjuvant TCHP, left breast lumpectomy and SLNB. S/p adjuvant RT left breast with a lumpectomy bed boost to 52.56 Gy in 20 fx, completed 5/12/23, and  herceptin/perjeta. Synchronous diagnosis of papillary thyroid carcinoma s/p thyroidectomy. On anastrazole.    PLAN:    FAWN on exams and most recent mammograms. Exam consistent with post-treatment fibrosis of the medial and central breast. We discussed gentle self massages to potentially alleviate tenderness, NSAIDs as needed, can consider referral for massage therapy.  I will see her for follow-up in 1 year, or sooner if needed.    Thank you for allowing me to participate in this patient's care.    Rosalio Munoz MD  Department of Radiation Oncology  [facility]    Portions of this note were generated using voice recognition software, and may be subject to transcription errors.

## 2024-01-17 ENCOUNTER — HOSPITAL ENCOUNTER (OUTPATIENT)
Dept: CARDIOLOGY | Facility: HOSPITAL | Age: 39
Discharge: HOME | End: 2024-01-17
Payer: COMMERCIAL

## 2024-01-17 ENCOUNTER — OFFICE VISIT (OUTPATIENT)
Dept: CARDIOLOGY | Facility: HOSPITAL | Age: 39
End: 2024-01-17
Payer: COMMERCIAL

## 2024-01-17 VITALS
RESPIRATION RATE: 18 BRPM | BODY MASS INDEX: 18.64 KG/M2 | TEMPERATURE: 98.1 F | HEART RATE: 80 BPM | SYSTOLIC BLOOD PRESSURE: 109 MMHG | DIASTOLIC BLOOD PRESSURE: 66 MMHG | WEIGHT: 95.46 LBS | OXYGEN SATURATION: 99 %

## 2024-01-17 DIAGNOSIS — Z78.9 NEVER SMOKED ANY SUBSTANCE: ICD-10-CM

## 2024-01-17 DIAGNOSIS — Z79.899 ENCOUNTER FOR MONITORING CARDIOTOXIC DRUG THERAPY: ICD-10-CM

## 2024-01-17 DIAGNOSIS — R00.2 PALPITATIONS: Primary | ICD-10-CM

## 2024-01-17 DIAGNOSIS — Z51.81 ENCOUNTER FOR MONITORING CARDIOTOXIC DRUG THERAPY: ICD-10-CM

## 2024-01-17 DIAGNOSIS — C50.912 INVASIVE DUCTAL CARCINOMA OF LEFT BREAST (MULTI): ICD-10-CM

## 2024-01-17 DIAGNOSIS — C50.912 INVASIVE DUCTAL CARCINOMA OF LEFT BREAST (MULTI): Primary | ICD-10-CM

## 2024-01-17 LAB
EJECTION FRACTION APICAL 4 CHAMBER: 58.6
EJECTION FRACTION: 57
LEFT ATRIUM VOLUME AREA LENGTH INDEX BSA: 13.1
LEFT VENTRICLE INTERNAL DIMENSION DIASTOLE: 3.34 (ref 3.5–6)
LEFT VENTRICULAR OUTFLOW TRACT DIAMETER: 1.6
MITRAL VALVE E/A RATIO: 1.11
MITRAL VALVE E/E' RATIO: 7.02
RIGHT VENTRICLE FREE WALL PEAK S': 13
TRICUSPID ANNULAR PLANE SYSTOLIC EXCURSION: 2.1

## 2024-01-17 PROCEDURE — 3008F BODY MASS INDEX DOCD: CPT | Performed by: INTERNAL MEDICINE

## 2024-01-17 PROCEDURE — 1036F TOBACCO NON-USER: CPT | Performed by: INTERNAL MEDICINE

## 2024-01-17 PROCEDURE — 93308 TTE F-UP OR LMTD: CPT | Performed by: INTERNAL MEDICINE

## 2024-01-17 PROCEDURE — 99214 OFFICE O/P EST MOD 30 MIN: CPT | Performed by: INTERNAL MEDICINE

## 2024-01-17 PROCEDURE — 3074F SYST BP LT 130 MM HG: CPT | Performed by: INTERNAL MEDICINE

## 2024-01-17 PROCEDURE — 93356 MYOCRD STRAIN IMG SPCKL TRCK: CPT | Performed by: INTERNAL MEDICINE

## 2024-01-17 PROCEDURE — 93321 DOPPLER ECHO F-UP/LMTD STD: CPT | Performed by: INTERNAL MEDICINE

## 2024-01-17 PROCEDURE — 3078F DIAST BP <80 MM HG: CPT | Performed by: INTERNAL MEDICINE

## 2024-01-17 PROCEDURE — 76376 3D RENDER W/INTRP POSTPROCES: CPT

## 2024-01-17 PROCEDURE — 76376 3D RENDER W/INTRP POSTPROCES: CPT | Performed by: INTERNAL MEDICINE

## 2024-01-17 PROCEDURE — 93325 DOPPLER ECHO COLOR FLOW MAPG: CPT | Performed by: INTERNAL MEDICINE

## 2024-01-17 ASSESSMENT — ENCOUNTER SYMPTOMS
MUSCULOSKELETAL NEGATIVE: 1
CONSTITUTIONAL NEGATIVE: 1
RESPIRATORY NEGATIVE: 1
GASTROINTESTINAL NEGATIVE: 1
ENDOCRINE NEGATIVE: 1
PALPITATIONS: 1
HEMATOLOGIC/LYMPHATIC NEGATIVE: 1
SHORTNESS OF BREATH: 0
NERVOUS/ANXIOUS: 1
EYES NEGATIVE: 1

## 2024-01-17 ASSESSMENT — PAIN SCALES - GENERAL: PAINLEVEL: 0-NO PAIN

## 2024-01-17 NOTE — PROGRESS NOTES
Patient:  Argelia Clay  YOB: 1985  MRN: [id number]       Chief Complaint/Active Symptoms:       Argelia Clay is a 38 y.o. female who returns today for cardiac follow-up.    Patient here for follow-up with limited echo after completing active therapy for her left breast cancer. Echo earlier today with images reviewed - final results pending.     Cancer Diagnosis: left breast cancer, HER2+ diagnosed 8/2022. Treated with neoTCHPx6, lumpectomy with SLNbx and completed XRT 5/12/23, started tamoxifen 6/2023, on traztuzumab / pertuzumab and completed adjuvant Herceptin / perjeta 11/2023    Cancer Diagnosis #2: Thyroid papillary cancer s/p total thyroidectomy.     Patient is doing well. Has started having some palpitations that she describes as a skipped beat and occasionally a fast or pounding heart beat. These occur once a week or so and can last for 5-10 minutes. No associated dizziness or lightheadedness. No syncope, no other symptoms with them and no pattern to them at this time. Has an wenceslao that looks at her heart rate and oxygen sats at the time. Her oxygen is fine and HR ranges from . No actual rhythm strip.     Has no chest pain or discomfort, no shortness of breath, orthopnea or PND. No edema, claudications. As above, no syncope or near syncope - even with her palpitations.     Review of Systems   Constitutional: Negative.    HENT: Negative.     Eyes: Negative.    Respiratory: Negative.  Negative for shortness of breath.    Cardiovascular:  Positive for palpitations. Negative for chest pain and leg swelling.   Gastrointestinal: Negative.    Endocrine: Negative.    Genitourinary: Negative.    Musculoskeletal: Negative.    Hematological: Negative.    Psychiatric/Behavioral:  The patient is nervous/anxious.    All other systems reviewed and are negative.      Objective:     Vitals:    01/17/24 1120   BP: 109/66   Pulse: 80   Resp: 18   Temp: 36.7 °C (98.1 °F)   SpO2: 99%       Vitals:    01/17/24 1120    Weight: (!) 43.3 kg (95 lb 7.4 oz)       Allergies:     Allergies   Allergen Reactions    Amoxicillin Anaphylaxis, Hives, Itching, Other, Rash, Shortness of breath and Swelling     Tacycardia    Vancomycin Other          Medications:     Current Outpatient Medications   Medication Instructions    anastrozole (ARIMIDEX) 1 mg, oral, Daily, Swallow whole with a drink of water.    atorvastatin (LIPITOR) 20 mg, oral, Daily    fish oil concentrate (Omega-3) 120-180 mg capsule Fish Oil 1000 MG Oral Capsule   Refills: 0        Start : 1-Aug-2022   Active    garlic (garlic oiL) 1,000 mg capsule Garlic Oil 1000 MG Oral Capsule   Refills: 0        Start : 1-Aug-2022   Active    levothyroxine (SYNTHROID, LEVOXYL) 75 mcg, oral, Daily    multivitamin (Multiple Vitamins) tablet Multiple Vitamins Oral Tablet   Refills: 0        Start : 1-Aug-2022   Active    multivitamin with minerals tablet 1 tablet, oral, Daily    omega 3-dha-epa-fish oil (Fish OiL) 1,000 mg (120 mg-180 mg) capsule 1,000 mg       Physical Examination:   GENERAL:  Well developed, well nourished, in no acute distress.  HEENT: NC AT, EOMI with anicteric sclera  NECK:  Supple, no JVD, no bruit.  CHEST:  Symmetric and nontender.  LUNGS:  Clear to auscultation bilaterally, normal respiratory effort.  HEART:  PMI nondisplaced, RRR with normal S1 and S2, no S3, murmur, rub, click.   ABDOMEN: Soft, NT, ND without palpable organomegaly or bruits  EXTREMITIES:  Warm with good color, no clubbing or cyanosis.  There is no edema noted.  PERIPHERAL VASCULAR:  Pulses present and equally palpable; 2+ throughout.  MUSCULOSKELETAL: No significant joint or limb deformity.   NEURO/PSYCH:  Alert and oriented times three with approppriate behavior and responses. Nonfocal motor examination with normal gait and ambulation  Lymph: No significant palpable lymphadenopathy  Skin: no rash or lesions on exposed skin or reported.    Lab:     CBC:   Lab Results   Component Value Date    WBC  4.0 (L) 08/24/2023    RBC 4.72 08/24/2023    HGB 12.4 08/24/2023    HCT 38.6 08/24/2023     08/24/2023        CMP:    Lab Results   Component Value Date     09/05/2023    K 4.3 09/05/2023     09/05/2023    CO2 25 09/05/2023    BUN 10 09/05/2023    CREATININE 0.54 09/05/2023    GLUCOSE 92 09/05/2023    CALCIUM 9.5 09/05/2023       Magnesium:    Lab Results   Component Value Date    MG 1.98 04/01/2023    MG CANCELED 04/01/2023       Lipid Profile:    Lab Results   Component Value Date    TRIG 235 (H) 10/26/2023    HDL 32.2 10/26/2023    LDLCALC 17 10/26/2023       TSH:    Lab Results   Component Value Date    TSH 3.62 09/05/2023       BNP:   Lab Results   Component Value Date    BNP 2 10/26/2023        PT/INR:    Lab Results   Component Value Date    PROTIME 12.0 04/14/2023    INR 1.0 04/14/2023       HgBA1c:    Lab Results   Component Value Date    HGBA1C 5.2 08/24/2023       BMP:  Lab Results   Component Value Date     09/05/2023     08/11/2023     05/19/2023    K 4.3 09/05/2023    K 3.3 (L) 08/11/2023    K 3.3 (L) 05/19/2023     09/05/2023     08/11/2023     05/19/2023    CO2 25 09/05/2023    CO2 26 08/11/2023    CO2 26 05/19/2023    BUN 10 09/05/2023    BUN 10 08/11/2023    BUN 10 05/19/2023    CREATININE 0.54 09/05/2023    CREATININE 0.50 08/11/2023    CREATININE 0.49 (L) 05/19/2023       Cardiac Enzymes:    Lab Results   Component Value Date    TROPHS <3 10/26/2023    TROPHS <3 08/11/2023    TROPHS 3 10/28/2022       Hepatic Function Panel:    Lab Results   Component Value Date    ALKPHOS 48 09/05/2023    ALT 15 09/05/2023    AST 20 09/05/2023    PROT 8.3 (H) 09/05/2023    BILITOT 0.3 09/05/2023         Diagnostic Studies:     No echocardiogram results found for the past 12 months  Echo prelim today - ef 55-60% without RWMA.   No nuclear medicine results found for the past 12 months    No valid procedures specified.    EKG:   No results found for:  "\"EKG\"    Radiology:     No orders to display         ASSESSMENT     Problem List Items Addressed This Visit       Invasive ductal carcinoma of left breast (CMS/HCC)    Encounter for monitoring cardiotoxic drug therapy    Palpitations - Primary    Never smoked any substance       PLAN   Left breast cancer. S/P TCHPx 6  with Herceptin and perjeta x 1 year. Completed therapy without any ongoing complications. Echo washout today looks normal and unchanged from prior. To CHF type symptoms or suggestion of angina/chest pain. Will check CT cardiac score this summer due to left chest radiation and again in 5 years. If normal  / low risk would follow clinically on an as-needed basis.   Palpitations - benign by description and heart rates she obtained are not suggestive of any concerning tachycarrhythmia as the rates are nearly all <100 bpm. No other symptoms. Will follow clinically for now. If she gets recording on Apple watch or [place] we will review. If they become more frequent or symptomatic will do outpatient monitory.   Tobacco and weight status. A lifelong nonsmoker and BMI of 18.6.     Will see late summer after her CT cardiac score. Sooner if her palpitations change.                 "

## 2024-01-22 ENCOUNTER — TELEPHONE (OUTPATIENT)
Dept: CARDIOLOGY | Facility: HOSPITAL | Age: 39
End: 2024-01-22
Payer: COMMERCIAL

## 2024-01-22 NOTE — TELEPHONE ENCOUNTER
----- Message from Zoey Phillips MD sent at 1/19/2024  1:43 PM EST -----  The patient's echocardiogram showed normal heart function and borderline strain but no concerning change or abnormality from her prior echo  ----- Message -----  From: Interface, [facility] - Cardiology Results In  Sent: 1/17/2024   3:00 PM EST  To: Zoey Phillips MD

## 2024-02-15 ENCOUNTER — OFFICE VISIT (OUTPATIENT)
Dept: HEMATOLOGY/ONCOLOGY | Facility: CLINIC | Age: 39
End: 2024-02-15
Payer: COMMERCIAL

## 2024-02-15 VITALS
HEART RATE: 80 BPM | SYSTOLIC BLOOD PRESSURE: 108 MMHG | DIASTOLIC BLOOD PRESSURE: 75 MMHG | BODY MASS INDEX: 17.71 KG/M2 | HEIGHT: 61 IN | WEIGHT: 93.8 LBS

## 2024-02-15 DIAGNOSIS — C50.912 INVASIVE DUCTAL CARCINOMA OF LEFT BREAST (MULTI): Primary | ICD-10-CM

## 2024-02-15 PROCEDURE — 99215 OFFICE O/P EST HI 40 MIN: CPT | Performed by: STUDENT IN AN ORGANIZED HEALTH CARE EDUCATION/TRAINING PROGRAM

## 2024-02-15 PROCEDURE — 3074F SYST BP LT 130 MM HG: CPT | Performed by: STUDENT IN AN ORGANIZED HEALTH CARE EDUCATION/TRAINING PROGRAM

## 2024-02-15 PROCEDURE — 1036F TOBACCO NON-USER: CPT | Performed by: STUDENT IN AN ORGANIZED HEALTH CARE EDUCATION/TRAINING PROGRAM

## 2024-02-15 PROCEDURE — 3008F BODY MASS INDEX DOCD: CPT | Performed by: STUDENT IN AN ORGANIZED HEALTH CARE EDUCATION/TRAINING PROGRAM

## 2024-02-15 PROCEDURE — 3078F DIAST BP <80 MM HG: CPT | Performed by: STUDENT IN AN ORGANIZED HEALTH CARE EDUCATION/TRAINING PROGRAM

## 2024-02-15 RX ORDER — FAMOTIDINE 10 MG/ML
20 INJECTION INTRAVENOUS ONCE AS NEEDED
Status: CANCELLED | OUTPATIENT
Start: 2024-05-24

## 2024-02-15 RX ORDER — ALBUTEROL SULFATE 0.83 MG/ML
3 SOLUTION RESPIRATORY (INHALATION) AS NEEDED
Status: CANCELLED | OUTPATIENT
Start: 2024-05-24

## 2024-02-15 RX ORDER — EPINEPHRINE 0.3 MG/.3ML
0.3 INJECTION SUBCUTANEOUS EVERY 5 MIN PRN
Status: CANCELLED | OUTPATIENT
Start: 2024-05-24

## 2024-02-15 RX ORDER — DIPHENHYDRAMINE HYDROCHLORIDE 50 MG/ML
50 INJECTION INTRAMUSCULAR; INTRAVENOUS AS NEEDED
Status: CANCELLED | OUTPATIENT
Start: 2024-05-24

## 2024-02-15 ASSESSMENT — PAIN SCALES - GENERAL: PAINLEVEL: 2

## 2024-02-15 NOTE — PATIENT INSTRUCTIONS
Schedule Ultrasound.  Referred to Dermatology.  1st Zometa infusion to be scheduled with Lupron injection in June 2024.  Follow up in 6 months with María oTm NP.  Please call [phone] with questions or concerns.

## 2024-02-15 NOTE — PROGRESS NOTES
Breast Medical Oncology Clinic  Location: [facility]    Visit Type: Follow-up Visit    ONC History:     April 2022: Developed left bloody nipple dischargefter the delivery of her baby.    8/1/2022: Diagnostic mammogram and ultrasound showed extremely dense breasts, and an irregular mass with pleomorphic calcification in the central medial left breast at midline. An asymmetry was identified in the medial right breast at midline at posterior  depth, probably benign. Ultrasound characterized an irregular hypoechoic mass measuring 3.8 x 1.3 x 2.1 cm at 9:00, 2 cm FTN. Targeted ultrasound of the left axilla showed a borderline lymph node measuring 0.8 cm with borderline thickness of the cortex.      8/1/22: Ultrasound-guided biopsy of left breast 9:00, 2 cm FTN which showed grade 3 invasive ductal carcinoma ER+ 10% [place]+ 10% HER2 3+.  An additional biopsy of the left breast subareolar region showed intraductal papilloma.  Left axillary node biopsy  was negative.     8/10/22: Genetic testing ([employer]) was negative.   8/18/22: Breast tumor conference with recommendations for neoadjuvant chemotherapy.   9/1/22: CT  CAP: 2.3 cm hypervascular mass along the medial aspect of the left breast with prominent L axillary LN with a fatty notch but thickened cortex. Subcentimeter ground glass nodule left lower lobe consistent with metastatic focus.     9/1/22: NM bone scan: No evidence of metastatic disease   9/13/22: Subcentimeter ground glass nodule in the superior aspect of the LLL without hypermetabolic activity. Hypermetabolic R thyroid nodule.    9/16/22: First TCHP, completed 12/30/22 2/9/23: L PM and SLNBx: no residual disease, ypT0N0   3/29/23: Total thyroidectomy   5/12/23: completed XRT   6/1/23: started tamoxifen  11/3/23: Completed adjuvant Herceptin/perjeta      Subjective: Interval History    Presents for follow-up visit.     Headaches have resolved. Tolerating treatment better.     Reports new lump in upper right chest  "wall.     Reports new rash around her left breast-areola. Has not had this rash before. Not painful or erythematous but feels the breast itself is more painful.     Denies weight loss, new aches or pains, changes in appetite or energy level.     GYN History/Pertinent Family history:    PMH: None  Surgical:   Family: No family history of malignancy  Social: No tobacco use, no ETOH use  Menarche 15. Premenopausal. , age at first birth 32. No HRT.    Social History  Argelia Clay  reports that she has never smoked. She has never used smokeless tobacco.  She  reports no history of alcohol use.  She  reports no history of drug use.    ROS:     Review of Systems   All other systems reviewed and are negative.       Physical Examination:    /75 (BP Location: Left arm, Patient Position: Sitting, BP Cuff Size: Adult)   Pulse 80   Ht (S) 1.538 m (5' 0.55\")   Wt (!) 42.5 kg (93 lb 12.8 oz)   BMI 17.99 kg/m²     Physical Exam  Vitals and nursing note reviewed.   Constitutional:       General: She is not in acute distress.     Appearance: Normal appearance. She is not toxic-appearing.   HENT:      Head: Normocephalic and atraumatic.      Mouth/Throat:      Pharynx: Oropharynx is clear.   Eyes:      Extraocular Movements: Extraocular movements intact.      Conjunctiva/sclera: Conjunctivae normal.   Cardiovascular:      Rate and Rhythm: Normal rate and regular rhythm.   Pulmonary:      Effort: Pulmonary effort is normal. No respiratory distress.   Abdominal:      General: Abdomen is flat. Bowel sounds are normal.      Palpations: Abdomen is soft.   Musculoskeletal:         General: No swelling. Normal range of motion.      Cervical back: Normal range of motion.   Skin:     General: Skin is warm and dry.   Neurological:      General: No focal deficit present.      Mental Status: She is alert.   Psychiatric:         Mood and Affect: Mood normal.         Breast Examination:    No masses, skin or nipple changes in R " breast.    There is a small nodular density upper right chest wall several centimeters above incision from prior mediport.     In the left breast there is a new scaly erythematous serpiginous rash around the areola.     ECOG Performance Status:     [x] 0 Fully active, able to carry on all pre-disease performance without restriction  [] 1 Restricted in physically strenuous activity but ambulatory and able to carry out work of a light or sedentary nature, e.g., light house work, office work  [] 2 Ambulatory and capable of all selfcare but unable to carry out any work activities; up and about more than 50% of waking hours  [] 3 Capable of only limited selfcare; confined to bed or chair more than 50% of waking hours  [] 4 Completely disabled; cannot carry on any selfcare; totally confined to bed or chair  [] 5 Dead     Results:    Labs:  No new pertinent results since last visit    Imaging:  Reviewed 9/15/23 mammogram    Pathology:  No new pertinent results since last visit    Assessment:     Clinical prognostic stage IA (cT2 cN0 cM0) invasive ductal carcinoma of the L breast; Dx in August 2022; Grade 3; ER positive (10%), MT positive (10%), HER2 positive.  S/p neoadjuvant TCHP X6 with great response. S/p L PM and SLNbx, ypT0N0. Completed maintenance herceptin/perjeta, total one year. Every 3 month lupron and anastrozole.     Argelia is a very pleasant pre-menopausal woman with newly diagnosed breast cancer with no significant past medical history. Tolerating treatment relatively well with change to anastrozole from tamoxifen. New left breast rash and reported lump (not palpated) and new R chest wall nodule likely related to prior port. Will assess with imaging.       Plan:    Surgical Plan: S/p L PM and SLNbx, ypT0N0.     Additional biopsy: Not indicated  Radiation Plan: Completed with Dr. Munoz  Additional staging scans/DEXA/echo: Pre-chemotherapy staging scans reviewed with patient. No definitive evidence of  metastatic disease. Subcentimeter LLL nodule not hypermetabolic on PET, stable on  f/u CT chest. Hypermetabolic nodule on thyroid, found papillary thyroid cancer. She had echocardiogram every 3 months while receiving HER2 directed therapy.   Additional Path info (i.e Ki67, PDL1): Not indicated  Gene assays: Not indicated     Systemic treatment plan: She has been receiving OFS with every 3 month Leuprolide. Anastrozole.                    Intent: Curative                Clinical trial: N/A                Endocrine therapy: As above                HER2 treatment: Completed one year of trastuzumab/pertuzumab                Targeted agents: Not indicated                Chemotherapy: S/p neoTCHPX6                BMA: Discussed, she reports she needs lots of dental work. Will be done soon would like to receive in a few months.      Access: Removed  Supportive meds: None this visit  Genetic testing: Negative testing  Fertility preservation: Patient declined, reports no further plans for child bearing  Other active problems/orders:       Papillary thyroid carcinoma- s/p total thyroidectomy   Mucous in bowel movements: discussed monitoring this finding with patient however she reports she is very concerned and prefers to be evaluated by gastroenterology. Referral in place.   Sub-centimeter nodule non-hypermetabolic on PET: Stable on imaging from 9/22 to 9/23. No further follow-up needed  L breast rash- dermatology referral  Ultrasound ordered to assess R chest wall nodule (suspect related to prior port) and L breast changes. Will call with results.      Surveillance plan: Yearly mammogram    Follow-up: 6 months with NP    Patient expressed understanding of the plan outlined above. She had ample time to ask questions. She understands she can contact us should she have additional questions or issues arise in the interim.

## 2024-02-16 ENCOUNTER — HOSPITAL ENCOUNTER (OUTPATIENT)
Dept: RADIOLOGY | Facility: CLINIC | Age: 39
Discharge: HOME | End: 2024-02-16
Payer: COMMERCIAL

## 2024-02-16 DIAGNOSIS — C50.912 INVASIVE DUCTAL CARCINOMA OF LEFT BREAST (MULTI): ICD-10-CM

## 2024-02-16 PROCEDURE — 76642 ULTRASOUND BREAST LIMITED: CPT | Mod: BILATERAL PROCEDURE | Performed by: STUDENT IN AN ORGANIZED HEALTH CARE EDUCATION/TRAINING PROGRAM

## 2024-02-16 PROCEDURE — 76642 ULTRASOUND BREAST LIMITED: CPT | Mod: 50,59

## 2024-02-16 PROCEDURE — 76982 USE 1ST TARGET LESION: CPT | Mod: 50

## 2024-02-26 ENCOUNTER — TELEPHONE (OUTPATIENT)
Dept: HEMATOLOGY/ONCOLOGY | Facility: CLINIC | Age: 39
End: 2024-02-26
Payer: COMMERCIAL

## 2024-02-26 DIAGNOSIS — C50.912 INVASIVE DUCTAL CARCINOMA OF LEFT BREAST (MULTI): Primary | ICD-10-CM

## 2024-02-27 ENCOUNTER — LAB (OUTPATIENT)
Dept: LAB | Facility: LAB | Age: 39
End: 2024-02-27
Payer: COMMERCIAL

## 2024-02-27 DIAGNOSIS — Z85.850 PERSONAL HISTORY OF MALIGNANT NEOPLASM OF THYROID: ICD-10-CM

## 2024-02-27 DIAGNOSIS — E21.1 SECONDARY HYPERPARATHYROIDISM, NOT ELSEWHERE CLASSIFIED (MULTI): Primary | ICD-10-CM

## 2024-02-27 LAB
25(OH)D3 SERPL-MCNC: 39 NG/ML (ref 30–100)
CA-I BLD-SCNC: 1.25 MMOL/L (ref 1.1–1.33)
PTH-INTACT SERPL-MCNC: 29.8 PG/ML (ref 18.5–88)
TSH SERPL-ACNC: 0.02 MIU/L (ref 0.44–3.98)

## 2024-02-27 PROCEDURE — 82306 VITAMIN D 25 HYDROXY: CPT

## 2024-02-27 PROCEDURE — 83970 ASSAY OF PARATHORMONE: CPT

## 2024-02-27 PROCEDURE — 36415 COLL VENOUS BLD VENIPUNCTURE: CPT

## 2024-02-27 PROCEDURE — 86800 THYROGLOBULIN ANTIBODY: CPT

## 2024-02-27 PROCEDURE — 84432 ASSAY OF THYROGLOBULIN: CPT

## 2024-02-27 PROCEDURE — 82330 ASSAY OF CALCIUM: CPT

## 2024-02-27 PROCEDURE — 84443 ASSAY THYROID STIM HORMONE: CPT

## 2024-02-27 NOTE — TELEPHONE ENCOUNTER
Called and spoke with patient. Ultrasound result reviewed and reiterated pt to follow up with IR. Will have scheduling contact patient. No further needs at this time.

## 2024-02-28 ENCOUNTER — PREP FOR PROCEDURE (OUTPATIENT)
Dept: RADIOLOGY | Facility: HOSPITAL | Age: 39
End: 2024-02-28
Payer: COMMERCIAL

## 2024-02-28 DIAGNOSIS — C50.912 INVASIVE DUCTAL CARCINOMA OF LEFT BREAST (MULTI): Primary | ICD-10-CM

## 2024-02-29 LAB
BILL ONLY-THYROGLOBULIN: NORMAL
THYROGLOB AB SERPL-ACNC: 0.9 IU/ML (ref 0–4)
THYROGLOB SERPL-MCNC: <0.1 NG/ML (ref 1.3–31.8)
THYROGLOB SERPL-MCNC: ABNORMAL NG/ML (ref 1.3–31.8)

## 2024-03-05 ENCOUNTER — HOSPITAL ENCOUNTER (OUTPATIENT)
Dept: RADIOLOGY | Facility: HOSPITAL | Age: 39
Discharge: HOME | End: 2024-03-05
Payer: COMMERCIAL

## 2024-03-05 VITALS
TEMPERATURE: 98 F | DIASTOLIC BLOOD PRESSURE: 60 MMHG | HEART RATE: 76 BPM | RESPIRATION RATE: 18 BRPM | SYSTOLIC BLOOD PRESSURE: 98 MMHG | OXYGEN SATURATION: 100 %

## 2024-03-05 DIAGNOSIS — M79.5 FOREIGN BODY (FB) IN SOFT TISSUE: Primary | ICD-10-CM

## 2024-03-05 DIAGNOSIS — C50.912 INVASIVE DUCTAL CARCINOMA OF LEFT BREAST (MULTI): ICD-10-CM

## 2024-03-05 PROCEDURE — 99203 OFFICE O/P NEW LOW 30 MIN: CPT | Performed by: PHYSICIAN ASSISTANT

## 2024-03-05 ASSESSMENT — ENCOUNTER SYMPTOMS
EYES NEGATIVE: 1
MUSCULOSKELETAL NEGATIVE: 1
PSYCHIATRIC NEGATIVE: 1
CARDIOVASCULAR NEGATIVE: 1
RESPIRATORY NEGATIVE: 1
GASTROINTESTINAL NEGATIVE: 1
CONSTITUTIONAL NEGATIVE: 1
NEUROLOGICAL NEGATIVE: 1

## 2024-03-05 NOTE — CONSULTS
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      HPI  Ms. Clay presents today for evaluation of possible foreign body retained after mediport removal.  Mediport was initially placed in 9/2022 at [facility] and subsequently removed on 12/7/2023 at [facility].  She states that she has noticed a bump near her port removal site since that time.  She saw oncologist Dr. Alvarenga who ordered a US breast to further assess.  This was completed on 2/16 and showed \"a parallel tubular structure with echogenic rim at the area of palpable lump near the clavicle, 3 cm superior to the patient's port scar. On real-time scanning, this has the appearance of foreign body. This may represent retained catheter/tubing fragment with granulation tissue.\"    Ms. Clay states that she notices this daily, especially when she is holding and caring for her children.  She denies pain with palpation, numbness/tingling, fevers/chills, redness/swelling associated with this bump.    Interventional Radiology has been consulted for: evaluation for removal of foreign body.    Review of Systems   Constitutional: Negative.    HENT: Negative.     Eyes: Negative.    Respiratory: Negative.     Cardiovascular: Negative.    Gastrointestinal: Negative.    Genitourinary: Negative.    Musculoskeletal: Negative.    Neurological: Negative.    Psychiatric/Behavioral: Negative.         Past Medical History:   Diagnosis Date    Hyperlipidemia      Past Surgical History:   Procedure Laterality Date    IR CVC PORT REMOVAL  12/7/2023    IR CVC PORT REMOVAL 12/7/2023 [facility] CVEPINV    OTHER SURGICAL HISTORY  08/02/2022    No history of surgery     Social History     Socioeconomic History    Marital status:      Spouse name: Not on file    Number of children: Not on file    Years of education: Not on file    Highest education level: Not on " file   Occupational History    Not on file   Tobacco Use    Smoking status: Never    Smokeless tobacco: Never   Vaping Use    Vaping Use: Never used   Substance and Sexual Activity    Alcohol use: Never    Drug use: Never    Sexual activity: Not on file   Other Topics Concern    Not on file   Social History Narrative    Not on file     Social Determinants of Health     Financial Resource Strain: Not on file   Food Insecurity: Not on file   Transportation Needs: Not on file   Physical Activity: Not on file   Stress: Not on file   Social Connections: Not on file   Intimate Partner Violence: Not on file   Housing Stability: Not on file     Family History   Problem Relation Name Age of Onset    No Known Problems Mother      No Known Problems Father       Allergies   Allergen Reactions    Amoxicillin Anaphylaxis, Hives, Itching, Other, Rash, Shortness of breath and Swelling     Tacycardia    Vancomycin Other     Current Outpatient Medications on File Prior to Encounter   Medication Sig Dispense Refill    anastrozole (Arimidex) 1 mg tablet Take 1 tablet (1 mg total) by mouth once daily.  Swallow whole with a drink of water. 90 tablet 1    atorvastatin (Lipitor) 20 mg tablet Take 1 tablet (20 mg) by mouth once daily.      fish oil concentrate (Omega-3) 120-180 mg capsule Fish Oil 1000 MG Oral Capsule   Refills: 0        Start : 1-Aug-2022   Active      garlic (garlic oiL) 1,000 mg capsule Garlic Oil 1000 MG Oral Capsule   Refills: 0        Start : 1-Aug-2022   Active      levothyroxine (Synthroid, Levoxyl) 75 mcg tablet Take 1 tablet (75 mcg) by mouth once daily.      multivitamin (Multiple Vitamins) tablet Multiple Vitamins Oral Tablet   Refills: 0        Start : 1-Aug-2022   Active      multivitamin with minerals tablet Take 1 tablet by mouth once daily.      omega 3-dha-epa-fish oil (Fish OiL) 1,000 mg (120 mg-180 mg) capsule 1 capsule (1,000 mg).       No current facility-administered medications on file prior to  encounter.       Objective    Vitals:    03/05/24 0920   BP: 98/60   BP Location: Right arm   Patient Position: Sitting   Pulse: 76   Resp: 18   Temp: 36.7 °C (98 °F)   TempSrc: Temporal   SpO2: 100%       Physical Exam  Constitutional:       General: She is not in acute distress.     Appearance: Normal appearance. She is normal weight.   HENT:      Head: Normocephalic.   Eyes:      Conjunctiva/sclera: Conjunctivae normal.   Cardiovascular:      Rate and Rhythm: Normal rate.   Pulmonary:      Effort: Pulmonary effort is normal. No respiratory distress.   Abdominal:      General: Abdomen is flat. There is no distension.      Palpations: Abdomen is soft.   Musculoskeletal:         General: Normal range of motion.      Cervical back: Neck supple.      Right lower leg: No edema.      Left lower leg: No edema.      Comments: Ambulates independently and without assist device   Skin:     General: Skin is warm and dry.      Comments: Well healed R chest scar from former [facility].  Approximately 3cm above scar is a non-mobile, palpable linear bump approximately 1cm in length.  No discomfort with palpation.  No erythema or ecchymosis.   Neurological:      General: No focal deficit present.      Mental Status: She is alert and oriented to person, place, and time. Mental status is at baseline.   Psychiatric:         Mood and Affect: Mood normal.         Behavior: Behavior normal.         Pertinent Imaging  BI US BREAST LIMITED BILATERAL; 2/16/2024 9:08 am   IMPRESSION:  The palpable lump in the right chest wall is concerning for retained  catheter/tubing from patient's port. Clinical follow-up is  recommended.    Assessment & Plan   Computed MELD 3.0 unavailable. Necessary lab results were not found in the last year.  Computed MELD-Na unavailable. Necessary lab results were not found in the last year.      0- Fully active, able to carry on all pre-disease performance w/o restriction.      Current plan:   -patient and imaging  reviewed with Dr. Kain Toscanod/w patient and she would like to proceed with removal; all questions and concerns were answered  -will plan for removal in the coming weeks, patient preferred May or June for scheduling.      Manju Hummel PA-C    Vascular and Interventional Radiology  [facility]  [phone] Nursing  [phone] Scheduling

## 2024-03-08 ENCOUNTER — INFUSION (OUTPATIENT)
Dept: HEMATOLOGY/ONCOLOGY | Facility: CLINIC | Age: 39
End: 2024-03-08
Payer: COMMERCIAL

## 2024-03-08 ENCOUNTER — APPOINTMENT (OUTPATIENT)
Dept: CARDIOLOGY | Facility: HOSPITAL | Age: 39
End: 2024-03-08
Payer: COMMERCIAL

## 2024-03-08 VITALS
TEMPERATURE: 97.5 F | OXYGEN SATURATION: 99 % | RESPIRATION RATE: 16 BRPM | SYSTOLIC BLOOD PRESSURE: 108 MMHG | DIASTOLIC BLOOD PRESSURE: 60 MMHG | HEART RATE: 82 BPM

## 2024-03-08 DIAGNOSIS — C50.919 MALIGNANT NEOPLASM OF BREAST IN FEMALE, ESTROGEN RECEPTOR POSITIVE, UNSPECIFIED LATERALITY, UNSPECIFIED SITE OF BREAST (MULTI): ICD-10-CM

## 2024-03-08 DIAGNOSIS — Z17.0 MALIGNANT NEOPLASM OF BREAST IN FEMALE, ESTROGEN RECEPTOR POSITIVE, UNSPECIFIED LATERALITY, UNSPECIFIED SITE OF BREAST (MULTI): ICD-10-CM

## 2024-03-08 DIAGNOSIS — C50.912 INVASIVE DUCTAL CARCINOMA OF LEFT BREAST (MULTI): ICD-10-CM

## 2024-03-08 PROCEDURE — 96402 CHEMO HORMON ANTINEOPL SQ/IM: CPT

## 2024-03-08 PROCEDURE — 96372 THER/PROPH/DIAG INJ SC/IM: CPT | Performed by: STUDENT IN AN ORGANIZED HEALTH CARE EDUCATION/TRAINING PROGRAM

## 2024-03-08 PROCEDURE — 2500000004 HC RX 250 GENERAL PHARMACY W/ HCPCS (ALT 636 FOR OP/ED): Mod: JZ | Performed by: STUDENT IN AN ORGANIZED HEALTH CARE EDUCATION/TRAINING PROGRAM

## 2024-03-08 RX ORDER — EPINEPHRINE 0.3 MG/.3ML
0.3 INJECTION SUBCUTANEOUS EVERY 5 MIN PRN
Status: CANCELLED | OUTPATIENT
Start: 2024-05-31

## 2024-03-08 RX ORDER — HEPARIN SODIUM,PORCINE/PF 10 UNIT/ML
50 SYRINGE (ML) INTRAVENOUS AS NEEDED
Status: CANCELLED | OUTPATIENT
Start: 2024-03-08

## 2024-03-08 RX ORDER — ALBUTEROL SULFATE 0.83 MG/ML
3 SOLUTION RESPIRATORY (INHALATION) AS NEEDED
Status: CANCELLED | OUTPATIENT
Start: 2024-05-31

## 2024-03-08 RX ORDER — FAMOTIDINE 10 MG/ML
20 INJECTION INTRAVENOUS ONCE AS NEEDED
Status: CANCELLED | OUTPATIENT
Start: 2024-05-31

## 2024-03-08 RX ORDER — HEPARIN 100 UNIT/ML
500 SYRINGE INTRAVENOUS AS NEEDED
Status: DISCONTINUED | OUTPATIENT
Start: 2024-03-08 | End: 2024-03-08 | Stop reason: HOSPADM

## 2024-03-08 RX ORDER — DIPHENHYDRAMINE HYDROCHLORIDE 50 MG/ML
50 INJECTION INTRAMUSCULAR; INTRAVENOUS AS NEEDED
Status: CANCELLED | OUTPATIENT
Start: 2024-05-31

## 2024-03-08 RX ORDER — HEPARIN 100 UNIT/ML
500 SYRINGE INTRAVENOUS AS NEEDED
Status: CANCELLED | OUTPATIENT
Start: 2024-03-08

## 2024-03-08 RX ADMIN — LEUPROLIDE ACETATE 11.25 MG: KIT at 11:31

## 2024-03-08 ASSESSMENT — PATIENT HEALTH QUESTIONNAIRE - PHQ9
1. LITTLE INTEREST OR PLEASURE IN DOING THINGS: NOT AT ALL
SUM OF ALL RESPONSES TO PHQ9 QUESTIONS 1 AND 2: 0
2. FEELING DOWN, DEPRESSED OR HOPELESS: NOT AT ALL

## 2024-03-08 ASSESSMENT — PAIN SCALES - GENERAL: PAINLEVEL: 0-NO PAIN

## 2024-03-12 ENCOUNTER — APPOINTMENT (OUTPATIENT)
Dept: CARDIOLOGY | Facility: HOSPITAL | Age: 39
End: 2024-03-12
Payer: COMMERCIAL

## 2024-04-18 ENCOUNTER — APPOINTMENT (OUTPATIENT)
Dept: SURGERY | Facility: CLINIC | Age: 39
End: 2024-04-18
Payer: COMMERCIAL

## 2024-05-02 ENCOUNTER — OFFICE VISIT (OUTPATIENT)
Dept: SURGERY | Facility: CLINIC | Age: 39
End: 2024-05-02
Payer: COMMERCIAL

## 2024-05-02 VITALS
HEART RATE: 75 BPM | BODY MASS INDEX: 18.22 KG/M2 | DIASTOLIC BLOOD PRESSURE: 74 MMHG | SYSTOLIC BLOOD PRESSURE: 110 MMHG | WEIGHT: 95 LBS

## 2024-05-02 DIAGNOSIS — C73 THYROID CANCER (MULTI): Primary | ICD-10-CM

## 2024-05-02 PROCEDURE — 1036F TOBACCO NON-USER: CPT | Performed by: SURGERY

## 2024-05-02 PROCEDURE — 3008F BODY MASS INDEX DOCD: CPT | Performed by: SURGERY

## 2024-05-02 PROCEDURE — 3074F SYST BP LT 130 MM HG: CPT | Performed by: SURGERY

## 2024-05-02 PROCEDURE — 76536 US EXAM OF HEAD AND NECK: CPT | Performed by: SURGERY

## 2024-05-02 PROCEDURE — 3078F DIAST BP <80 MM HG: CPT | Performed by: SURGERY

## 2024-05-02 PROCEDURE — 99214 OFFICE O/P EST MOD 30 MIN: CPT | Performed by: SURGERY

## 2024-05-06 NOTE — OP NOTE
PREOPERATIVE DIAGNOSIS:  Papillary thyroid cancer.    POSTOPERATIVE DIAGNOSIS:  Papillary thyroid cancer.    OPERATION/PROCEDURE:  Total thyroidectomy.    SURGEON:  Hakeem Diane MD.    ASSISTANT(S):  Dr. Gordo Siegel.    ANESTHESIA:  General.    ESTIMATED BLOOD LOSS:  5 cc.    SURGICAL INDICATIONS:  The patient is a 38-year-old  woman who had been diagnosed with  breast cancer.  She underwent a PET scan and showed increased uptake  in her thyroid gland.  This corresponded to the bilateral thyroid  nodules.  These were both biopsied and both biopsy-proven papillary  thyroid cancer.  Therefore, she 1st underwent her surgery with Dr. Lizeth Nicole, Breast Surgery Oncology.  At this point, that has been  taken care of and we are now going to go ahead and proceed forward  with her total thyroidectomy for her papillary thyroid cancer.  Risks, benefits of surgery were discussed with the patient.  She  agreed and signed consent.     DESCRIPTION OF OPERATION:  On the operative date, she was brought to the operating room.  After  induction of anesthetic, she was prepped and draped in usual sterile  fashion with towel behind the shoulders and the neck gently extended.   She had SCDs on for DVT prophylaxis.  I made a 6 cm cervical collar  incision, divided down through platysma with Bovie electrocautery.  I  raised subplatysmal flaps superior to the notch in the thyroid  cartilage, inferiorly to the sternal notch, and laterally over the  sternocleidomastoid muscles.  We then  the strap muscles in  the midline.  We started on the right side retracting right  sternohyoid and right sternothyroid muscles out lateral to the level  of the carotid artery.  Middle thyroid vein was divided with 3-0 silk  ties for better exposure then.  Sitting in the mid upper third of the  right thyroid lobe was a hard nodule.  It was well contained.  This  represented the right-sided cancer.  We identified the  cricothyroid  space, went medial to lateral around the superior pole vessels with  2-0 and 3-0 silk ties proximally, LigaSure towards the thyroid  specimen side.  We then went down the trachea, took branches of the  inferior thyroid vein, then began rolling the thyroid gland up.  Sitting at the 8 o'clock position on the capsule, the thyroid was a  normal-appearing right inferior parathyroid gland peeled away intact  on a vascularized pedicle.  Sitting at the 10 o'clock position was a  normal-appearing right superior parathyroid gland also peeled away.  We then dissected out laterally, identified the inferior thyroid  artery.  Coursing underneath it was the right recurrent laryngeal  nerve.  We stayed anterior medial to the nerve with all 3-0 silk  ties.  LigaSure was not used in this area.  We then divided through  branches of the inferior thyroid artery and rolled the thyroid gland  up towards the trachea.  We followed the nerve all the way up in the  cricothyroid musculature.  We then divided behind the isthmus with  Bovie electrocautery.  There was a pyramidal lobe extending up to the  level of the thyroid cartilage.  We divided on either side and then  once it thinned out, transected with the LigaSure device.  We did  find one mildly enlarged pretracheal lymph node.  It was soft, pink  in color, and relatively normal looking, but we did go ahead and sent  it for frozen.  This was negative for malignancy.  We then went to  the left side, retracted left sternohyoid and left sternothyroid  muscles out lateral to the level of the carotid artery.  In the  midportion of the left thyroid lobe was a palpable thyroid nodule  representing the cancer on the left side.  This was also well  contained.  No invasion of the overlying strap muscle.  We again  divided the middle thyroid vein with 3-0 silk ties.  We again  identified the cricothyroid space, went medial to lateral on the  superior pole vessels.  Then, went down  over the trachea, took  branches of the inferior thyroid vein.  We began rolling the thyroid  gland up.  We dissected out into the tracheoesophageal groove and  identified the left recurrent laryngeal nerve.  At about the 5  o'clock position on the thyroid capsule was a normal-appearing left  inferior parathyroid gland peeled away intact on a vascularized  pedicle.  We then stayed anteromedial to the nerve with all 3-0 silk  ties.  Again, the LigaSure was not used in this area.  As we followed  the nerve up at the 2 o'clock position was a normal-appearing left  superior parathyroid gland peeled away intact on a vascularized  pedicle.  We then followed the last of the nerve into the  cricothyroid musculature, rolled the thyroid up onto the trachea and  removed the specimen.  The specimen was marked as thyroid with a  double-tailed suture on the right superior pole, single-tailed suture  on left superior pole, and sent to pathology.  We then irrigated the  neck out well and achieved hemostasis.  Both recurrent nerves were  intact along their course.  A small amount of oozing on both the  right and left sides kind of just up and medial to the nerve.  Both  of these areas were secured with 5-0 Prolene figure-of-eight sutures  done in anterior and parallel fashion to the nerve.  Parathyroids  otherwise looked well vascularized.  We used some Lilliam powder for  final hemostasis and then closed straps and platysma with 3-0 Vicryl,  skin with a 4-0 Monocryl, 0.5% Marcaine was used as local anesthetic,  and dry sterile dressings were applied.  The patient tolerated the  procedure well and was transferred to Recovery in stable condition.       Hakeem Diane MD    DD:  03/29/2023 17:06:21 EST  DT:  03/30/2023 10:19:37 EST  DICTATION NUMBER:  [id number]  INTERNAL JOB NUMBER:  [id number]    CC:  MD TOMAS Daly MD, Fax: [phone]        Electronic Signatures:  Benedicto  Hakeem SMILEY) (Signed on 02-Apr-2023 08:46)   Authored  Unsigned, Draft (SYS GENERATED) (Entered on 30-Mar-2023 10:19)   Entered    Last Updated: 02-Apr-2023 08:46 by Hakeem Diane)

## 2024-05-23 ENCOUNTER — HOSPITAL ENCOUNTER (OUTPATIENT)
Dept: RADIOLOGY | Facility: CLINIC | Age: 39
Discharge: HOME | End: 2024-05-23
Payer: COMMERCIAL

## 2024-05-23 DIAGNOSIS — C50.912 INVASIVE DUCTAL CARCINOMA OF LEFT BREAST (MULTI): ICD-10-CM

## 2024-05-23 PROCEDURE — 75571 CT HRT W/O DYE W/CA TEST: CPT

## 2024-05-24 ENCOUNTER — TELEPHONE (OUTPATIENT)
Dept: HEMATOLOGY/ONCOLOGY | Facility: CLINIC | Age: 39
End: 2024-05-24

## 2024-05-24 ENCOUNTER — APPOINTMENT (OUTPATIENT)
Dept: LAB | Facility: CLINIC | Age: 39
End: 2024-05-24
Payer: COMMERCIAL

## 2024-05-24 ENCOUNTER — INFUSION (OUTPATIENT)
Dept: HEMATOLOGY/ONCOLOGY | Facility: CLINIC | Age: 39
End: 2024-05-24
Payer: COMMERCIAL

## 2024-05-24 VITALS
WEIGHT: 99.43 LBS | SYSTOLIC BLOOD PRESSURE: 96 MMHG | HEART RATE: 68 BPM | HEIGHT: 61 IN | BODY MASS INDEX: 18.77 KG/M2 | RESPIRATION RATE: 18 BRPM | TEMPERATURE: 95.7 F | DIASTOLIC BLOOD PRESSURE: 59 MMHG | OXYGEN SATURATION: 100 %

## 2024-05-24 DIAGNOSIS — C50.912 INVASIVE DUCTAL CARCINOMA OF LEFT BREAST (MULTI): Primary | ICD-10-CM

## 2024-05-24 DIAGNOSIS — Z17.0 MALIGNANT NEOPLASM OF BREAST IN FEMALE, ESTROGEN RECEPTOR POSITIVE, UNSPECIFIED LATERALITY, UNSPECIFIED SITE OF BREAST (MULTI): ICD-10-CM

## 2024-05-24 DIAGNOSIS — C50.912 INVASIVE DUCTAL CARCINOMA OF LEFT BREAST (MULTI): ICD-10-CM

## 2024-05-24 DIAGNOSIS — C50.919 MALIGNANT NEOPLASM OF BREAST IN FEMALE, ESTROGEN RECEPTOR POSITIVE, UNSPECIFIED LATERALITY, UNSPECIFIED SITE OF BREAST (MULTI): ICD-10-CM

## 2024-05-24 LAB
ALBUMIN SERPL BCP-MCNC: 4.5 G/DL (ref 3.4–5)
ALP SERPL-CCNC: 53 U/L (ref 33–110)
ALT SERPL W P-5'-P-CCNC: 23 U/L (ref 7–45)
ANION GAP SERPL CALC-SCNC: 9 MMOL/L (ref 10–20)
AST SERPL W P-5'-P-CCNC: 16 U/L (ref 9–39)
BILIRUB SERPL-MCNC: 0.4 MG/DL (ref 0–1.2)
BUN SERPL-MCNC: 12 MG/DL (ref 6–23)
CALCIUM SERPL-MCNC: 9.3 MG/DL (ref 8.6–10.3)
CHLORIDE SERPL-SCNC: 103 MMOL/L (ref 98–107)
CO2 SERPL-SCNC: 31 MMOL/L (ref 21–32)
CREAT SERPL-MCNC: 0.52 MG/DL (ref 0.5–1.05)
EGFRCR SERPLBLD CKD-EPI 2021: >90 ML/MIN/1.73M*2
GLUCOSE SERPL-MCNC: 97 MG/DL (ref 74–99)
MAGNESIUM SERPL-MCNC: 1.85 MG/DL (ref 1.6–2.4)
PHOSPHATE SERPL-MCNC: 3.8 MG/DL (ref 2.5–4.9)
POTASSIUM SERPL-SCNC: 3.3 MMOL/L (ref 3.5–5.3)
PROT SERPL-MCNC: 8 G/DL (ref 6.4–8.2)
SODIUM SERPL-SCNC: 140 MMOL/L (ref 136–145)

## 2024-05-24 PROCEDURE — 80053 COMPREHEN METABOLIC PANEL: CPT

## 2024-05-24 PROCEDURE — 2500000004 HC RX 250 GENERAL PHARMACY W/ HCPCS (ALT 636 FOR OP/ED): Performed by: STUDENT IN AN ORGANIZED HEALTH CARE EDUCATION/TRAINING PROGRAM

## 2024-05-24 PROCEDURE — 96365 THER/PROPH/DIAG IV INF INIT: CPT | Mod: INF

## 2024-05-24 PROCEDURE — 84100 ASSAY OF PHOSPHORUS: CPT

## 2024-05-24 PROCEDURE — 83735 ASSAY OF MAGNESIUM: CPT

## 2024-05-24 RX ORDER — EPINEPHRINE 0.3 MG/.3ML
0.3 INJECTION SUBCUTANEOUS EVERY 5 MIN PRN
Status: CANCELLED | OUTPATIENT
Start: 2024-05-31

## 2024-05-24 RX ORDER — ANASTROZOLE 1 MG/1
1 TABLET ORAL DAILY
Qty: 90 TABLET | Refills: 3 | Status: SHIPPED | OUTPATIENT
Start: 2024-05-24

## 2024-05-24 RX ORDER — DIPHENHYDRAMINE HYDROCHLORIDE 50 MG/ML
50 INJECTION INTRAMUSCULAR; INTRAVENOUS AS NEEDED
Status: CANCELLED | OUTPATIENT
Start: 2024-05-31

## 2024-05-24 RX ORDER — DIPHENHYDRAMINE HYDROCHLORIDE 50 MG/ML
50 INJECTION INTRAMUSCULAR; INTRAVENOUS AS NEEDED
OUTPATIENT
Start: 2024-11-08

## 2024-05-24 RX ORDER — FAMOTIDINE 10 MG/ML
20 INJECTION INTRAVENOUS ONCE AS NEEDED
Status: DISCONTINUED | OUTPATIENT
Start: 2024-05-24 | End: 2024-05-24 | Stop reason: HOSPADM

## 2024-05-24 RX ORDER — FAMOTIDINE 10 MG/ML
20 INJECTION INTRAVENOUS ONCE AS NEEDED
Status: CANCELLED | OUTPATIENT
Start: 2024-05-31

## 2024-05-24 RX ORDER — EPINEPHRINE 0.3 MG/.3ML
0.3 INJECTION SUBCUTANEOUS EVERY 5 MIN PRN
Status: DISCONTINUED | OUTPATIENT
Start: 2024-05-24 | End: 2024-05-24 | Stop reason: HOSPADM

## 2024-05-24 RX ORDER — ALBUTEROL SULFATE 0.83 MG/ML
3 SOLUTION RESPIRATORY (INHALATION) AS NEEDED
OUTPATIENT
Start: 2024-11-08

## 2024-05-24 RX ORDER — ALBUTEROL SULFATE 0.83 MG/ML
3 SOLUTION RESPIRATORY (INHALATION) AS NEEDED
Status: DISCONTINUED | OUTPATIENT
Start: 2024-05-24 | End: 2024-05-24 | Stop reason: HOSPADM

## 2024-05-24 RX ORDER — EPINEPHRINE 0.3 MG/.3ML
0.3 INJECTION SUBCUTANEOUS EVERY 5 MIN PRN
OUTPATIENT
Start: 2024-11-08

## 2024-05-24 RX ORDER — HEPARIN 100 UNIT/ML
500 SYRINGE INTRAVENOUS AS NEEDED
OUTPATIENT
Start: 2024-05-24

## 2024-05-24 RX ORDER — FAMOTIDINE 10 MG/ML
20 INJECTION INTRAVENOUS ONCE AS NEEDED
OUTPATIENT
Start: 2024-11-08

## 2024-05-24 RX ORDER — ZOLEDRONIC ACID 0.04 MG/ML
4 INJECTION, SOLUTION INTRAVENOUS ONCE
Status: COMPLETED | OUTPATIENT
Start: 2024-05-24 | End: 2024-05-24

## 2024-05-24 RX ORDER — HEPARIN SODIUM,PORCINE/PF 10 UNIT/ML
50 SYRINGE (ML) INTRAVENOUS AS NEEDED
OUTPATIENT
Start: 2024-05-24

## 2024-05-24 RX ORDER — DIPHENHYDRAMINE HYDROCHLORIDE 50 MG/ML
50 INJECTION INTRAMUSCULAR; INTRAVENOUS AS NEEDED
Status: DISCONTINUED | OUTPATIENT
Start: 2024-05-24 | End: 2024-05-24 | Stop reason: HOSPADM

## 2024-05-24 RX ORDER — ALBUTEROL SULFATE 0.83 MG/ML
3 SOLUTION RESPIRATORY (INHALATION) AS NEEDED
Status: CANCELLED | OUTPATIENT
Start: 2024-05-31

## 2024-05-24 RX ADMIN — ZOLEDRONIC ACID 4 MG: 0.04 INJECTION, SOLUTION INTRAVENOUS at 12:45

## 2024-05-24 RX ADMIN — SODIUM CHLORIDE 500 ML: 9 INJECTION, SOLUTION INTRAVENOUS at 12:45

## 2024-05-24 ASSESSMENT — PAIN SCALES - GENERAL
PAINLEVEL_OUTOF10: 0-NO PAIN
PAINLEVEL: 0-NO PAIN

## 2024-05-28 ENCOUNTER — TELEPHONE (OUTPATIENT)
Dept: CARDIOLOGY | Facility: CLINIC | Age: 39
End: 2024-05-28
Payer: COMMERCIAL

## 2024-05-28 NOTE — TELEPHONE ENCOUNTER
"----- Message from Zoey Phillips MD sent at 5/28/2024  2:19 PM EDT -----  CT cardiac score is \"0\" = very low risk for heart attack.   ----- Message -----  From: Interface, Radiology Results In  Sent: 5/25/2024  10:37 AM EDT  To: Zoey Phillips MD      "

## 2024-05-29 ENCOUNTER — OFFICE VISIT (OUTPATIENT)
Dept: CARDIOLOGY | Facility: HOSPITAL | Age: 39
End: 2024-05-29
Payer: COMMERCIAL

## 2024-05-29 VITALS
WEIGHT: 99.65 LBS | SYSTOLIC BLOOD PRESSURE: 103 MMHG | DIASTOLIC BLOOD PRESSURE: 65 MMHG | RESPIRATION RATE: 16 BRPM | HEART RATE: 77 BPM | OXYGEN SATURATION: 100 % | BODY MASS INDEX: 19.11 KG/M2 | TEMPERATURE: 97.2 F

## 2024-05-29 DIAGNOSIS — R07.89 ATYPICAL CHEST PAIN: ICD-10-CM

## 2024-05-29 DIAGNOSIS — C50.912 INVASIVE DUCTAL CARCINOMA OF LEFT BREAST (MULTI): ICD-10-CM

## 2024-05-29 DIAGNOSIS — Z79.899 ENCOUNTER FOR MONITORING CARDIOTOXIC DRUG THERAPY: Primary | ICD-10-CM

## 2024-05-29 DIAGNOSIS — Z51.81 ENCOUNTER FOR MONITORING CARDIOTOXIC DRUG THERAPY: Primary | ICD-10-CM

## 2024-05-29 DIAGNOSIS — R00.2 PALPITATIONS: ICD-10-CM

## 2024-05-29 PROCEDURE — 3008F BODY MASS INDEX DOCD: CPT | Performed by: INTERNAL MEDICINE

## 2024-05-29 PROCEDURE — 3078F DIAST BP <80 MM HG: CPT | Performed by: INTERNAL MEDICINE

## 2024-05-29 PROCEDURE — 99213 OFFICE O/P EST LOW 20 MIN: CPT | Performed by: INTERNAL MEDICINE

## 2024-05-29 PROCEDURE — 1036F TOBACCO NON-USER: CPT | Performed by: INTERNAL MEDICINE

## 2024-05-29 PROCEDURE — 3074F SYST BP LT 130 MM HG: CPT | Performed by: INTERNAL MEDICINE

## 2024-05-29 ASSESSMENT — ENCOUNTER SYMPTOMS
LIGHT-HEADEDNESS: 0
DIZZINESS: 0
SHORTNESS OF BREATH: 0
FATIGUE: 1
NERVOUS/ANXIOUS: 1
GASTROINTESTINAL NEGATIVE: 1
PALPITATIONS: 1

## 2024-05-29 ASSESSMENT — PAIN SCALES - GENERAL: PAINLEVEL: 0-NO PAIN

## 2024-05-29 NOTE — PROGRESS NOTES
"  Patient:  Argelia Clay  YOB: 1985  MRN: [id number]       Chief Complaint/Active Symptoms:       Argelia Clay is a 39 y.o. female who returns today for cardiac follow-up.    Patient here for follow-up after CAC. Score was \"0\". Patient still has an intermittent vague mild discomfort in her upper chest that she describes as \"chest stress\". There is no clear association with physical activity or meals. Is very faint and mild in intensity. No clear other associated symptoms and is relatively short in duration (? Minutes? As patient feels it hard to describe). Also continues to have a symptom of an occasional skipped heart beat without associated dizzy or lightheaded feeling. No sustained tachycardia.     Cancer Diagnosis: left breast cancer, HER2+ diagnosed 8/2022. Treated with neoTCHPx6, lumpectomy with SLNbx and completed XRT 5/12/23, started tamoxifen 6/2023, on traztuzumab / pertuzumab and completed adjuvant Herceptin / perjeta 11/2023     Cancer Diagnosis #2: Thyroid papillary cancer s/p total thyroidectomy.     Review of Systems   Constitutional:  Positive for fatigue.   Respiratory:  Negative for shortness of breath.    Cardiovascular:  Positive for chest pain and palpitations. Negative for leg swelling.   Gastrointestinal: Negative.    Genitourinary: Negative.    Neurological:  Negative for dizziness and light-headedness.   Psychiatric/Behavioral:  The patient is nervous/anxious.    All other systems reviewed and are negative.      Objective:     Vitals:    05/29/24 1106   BP: 103/65   Pulse: 77   Resp: 16   Temp: 36.2 °C (97.2 °F)   SpO2: 100%       Vitals:    05/29/24 1106   Weight: 45.2 kg (99 lb 10.4 oz)       Allergies:     Allergies   Allergen Reactions    Amoxicillin Anaphylaxis, Hives, Itching, Other, Rash, Shortness of breath and Swelling     Tacycardia    Vancomycin Other          Medications:     Current Outpatient Medications   Medication Instructions    anastrozole (ARIMIDEX) 1 mg, oral, " Daily, Swallow whole with a drink of water.    atorvastatin (LIPITOR) 20 mg, oral, Daily    fish oil concentrate (Omega-3) 120-180 mg capsule Fish Oil 1000 MG Oral Capsule   Refills: 0        Start : 1-Aug-2022   Active    garlic (garlic oiL) 1,000 mg capsule Garlic Oil 1000 MG Oral Capsule   Refills: 0        Start : 1-Aug-2022   Active    levothyroxine (SYNTHROID, LEVOXYL) 88 mcg, oral, Daily, 88mcg Mon-Sat; 44mcg Sunday per patient report    multivitamin (Multiple Vitamins) tablet Multiple Vitamins Oral Tablet   Refills: 0        Start : 1-Aug-2022   Active    multivitamin with minerals tablet 1 tablet, oral, Daily    omega 3-dha-epa-fish oil (Fish OiL) 1,000 mg (120 mg-180 mg) capsule 1,000 mg       Physical Examination:   GENERAL:  Well developed, well nourished, in no acute distress.  HEENT: NC AT, EOMI with anicteric sclera  NECK:  Supple, no JVD, no bruit.  CHEST:  Symmetric and nontender.  LUNGS:  Clear to auscultation bilaterally, normal respiratory effort.  HEART:  PMI is nondisplaced. RRR with normal S1 and S2, no S3, no mumur or rub. No carotid or abdominal bruits  EXTREMITIES:  Warm with good color, no clubbing or cyanosis.  There is no edema noted.  PERIPHERAL VASCULAR:  Pulses present and equally palpable; 2+ throughout.  MUSCULOSKELETAL:   NEURO/PSYCH:  Alert and oriented times three with approppriate behavior and responses. Nonfocal motor examination with normal gait and ambulation  Skin: no rash or lesions on exposed skin or reported.    Lab:     CBC:   Lab Results   Component Value Date    WBC 4.0 (L) 08/24/2023    RBC 4.72 08/24/2023    HGB 12.4 08/24/2023    HCT 38.6 08/24/2023     08/24/2023        CMP:    Lab Results   Component Value Date     05/24/2024    K 3.3 (L) 05/24/2024     05/24/2024    CO2 31 05/24/2024    BUN 12 05/24/2024    CREATININE 0.52 05/24/2024    GLUCOSE 97 05/24/2024    CALCIUM 9.3 05/24/2024       Magnesium:    Lab Results   Component Value Date    MG  "1.85 05/24/2024       Lipid Profile:    Lab Results   Component Value Date    TRIG 235 (H) 10/26/2023    HDL 32.2 10/26/2023    LDLCALC 17 10/26/2023       TSH:    Lab Results   Component Value Date    TSH 0.02 (L) 02/27/2024       BNP:   Lab Results   Component Value Date    BNP 2 10/26/2023        PT/INR:    Lab Results   Component Value Date    PROTIME 12.0 04/14/2023    INR 1.0 04/14/2023       HgBA1c:    Lab Results   Component Value Date    HGBA1C 5.2 08/24/2023       BMP:  Lab Results   Component Value Date     05/24/2024     09/05/2023     08/11/2023     05/19/2023    K 3.3 (L) 05/24/2024    K 4.3 09/05/2023    K 3.3 (L) 08/11/2023    K 3.3 (L) 05/19/2023     05/24/2024     09/05/2023     08/11/2023     05/19/2023    CO2 31 05/24/2024    CO2 25 09/05/2023    CO2 26 08/11/2023    CO2 26 05/19/2023    BUN 12 05/24/2024    BUN 10 09/05/2023    BUN 10 08/11/2023    BUN 10 05/19/2023    CREATININE 0.52 05/24/2024    CREATININE 0.54 09/05/2023    CREATININE 0.50 08/11/2023    CREATININE 0.49 (L) 05/19/2023       Cardiac Enzymes:    Lab Results   Component Value Date    TROPHS <3 10/26/2023    TROPHS <3 08/11/2023    TROPHS 3 10/28/2022       Hepatic Function Panel:    Lab Results   Component Value Date    ALKPHOS 53 05/24/2024    ALT 23 05/24/2024    AST 16 05/24/2024    PROT 8.0 05/24/2024    BILITOT 0.4 05/24/2024         Diagnostic Studies:     Echo 10/2023 with normal LV function  RAadiology:     CAC score was \"0\"    ASSESSMENT     Problem List Items Addressed This Visit       Invasive ductal carcinoma of left breast (Multi)    Encounter for monitoring cardiotoxic drug therapy - Primary    Palpitations    Atypical chest pain       PLAN   Left breast cancer with history of cardiotoxic drug therapy and radiation. No signs of CHF or premature CAD due to left chest radiation. May follow-up prn.   Atypical chest pain - not suggestive of angina and patient at low risk " for MI/CAD given CAC of zero. Follow clinically.   Palpitations. Benign by description and not suggestive of concerning arrhythmia. Would follow clinically.     Patient may follow-up in 5 years. May return sooner for evaluation if she develops any new problems or concerns.

## 2024-06-04 ENCOUNTER — HOSPITAL ENCOUNTER (OUTPATIENT)
Dept: RADIOLOGY | Facility: HOSPITAL | Age: 39
Discharge: HOME | End: 2024-06-04
Payer: COMMERCIAL

## 2024-06-04 VITALS
BODY MASS INDEX: 17.11 KG/M2 | DIASTOLIC BLOOD PRESSURE: 81 MMHG | WEIGHT: 93 LBS | RESPIRATION RATE: 16 BRPM | HEART RATE: 72 BPM | TEMPERATURE: 96.8 F | OXYGEN SATURATION: 100 % | SYSTOLIC BLOOD PRESSURE: 112 MMHG | HEIGHT: 62 IN

## 2024-06-04 DIAGNOSIS — M79.5 FOREIGN BODY (FB) IN SOFT TISSUE: ICD-10-CM

## 2024-06-04 PROCEDURE — 7100000010 HC PHASE TWO TIME - EACH INCREMENTAL 1 MINUTE

## 2024-06-04 PROCEDURE — 76942 ECHO GUIDE FOR BIOPSY: CPT

## 2024-06-04 PROCEDURE — 7100000009 HC PHASE TWO TIME - INITIAL BASE CHARGE

## 2024-06-04 ASSESSMENT — PAIN SCALES - GENERAL
PAINLEVEL_OUTOF10: 0 - NO PAIN

## 2024-06-04 ASSESSMENT — PAIN - FUNCTIONAL ASSESSMENT
PAIN_FUNCTIONAL_ASSESSMENT: 0-10

## 2024-06-04 NOTE — PRE-PROCEDURE NOTE
Interventional Radiology Preprocedure Note    Indication for procedure: The encounter diagnosis was Foreign body (FB) in soft tissue.    Relevant review of systems: NA    Relevant Labs:   Lab Results   Component Value Date    CREATININE 0.52 05/24/2024    EGFR >90 05/24/2024    INR 1.0 04/14/2023    PROTIME 12.0 04/14/2023    PREGTESTUR Negative 11/09/2023       Planned Sedation/Anesthesia: Minimal    Airway assessment: normal    Directed physical examination:    Physical Exam  Constitutional:       General: She is not in acute distress.  Pulmonary:      Effort: No respiratory distress.   Neurological:      Mental Status: She is alert and oriented to person, place, and time.   Psychiatric:         Mood and Affect: Mood normal.           Mallampati: III (soft and hard palate and base of uvula visible)    ASA Score: ASA 3 - Patient with moderate systemic disease with functional limitations    Benefits, risks and alternatives of procedure and planned sedation have been discussed with the patient and/or their representative. All questions answered and they agree to proceed.

## 2024-06-04 NOTE — POST-PROCEDURE NOTE
Interventional Radiology Brief Postprocedure Note    Attending: Mariana Finnegan MD    Assistant: Christ Faust DO    Diagnosis: right chest wall foreign body    Description of procedure: Successful removal of right chest wall subcutaneous foreign body. See radiology report for full detail.      Anesthesia:  Local    Complications: None    Estimated Blood Loss: none    Medications (Filter: Administrations occurring from 1100 to 1127 on 06/04/24)      None          No specimens collected      See detailed result report with images in PACS.    The patient tolerated the procedure well without incident or complication and is in stable condition.

## 2024-06-04 NOTE — DISCHARGE INSTRUCTIONS
Discharge information:    Remove outer dressing in 24 hours. Your incision was closed with surgical glue and/or steri strip bandages. Once removing the original dressing, you may leave the site open to air. Keep the site clean and dry and let the site heal. The glue and the steri strip bandages should fall off naturally in about 10-14 days.   While showering, keep covered until the site is healed.   No baths, jacuzzis, or swimming. Do not get submerged in a body of water (leads to increased risk of infection.)  Ok to keep open until healed. Healing is when a scab is created and falls off, usually within 5-10 days.     Look for signs and symptoms of infection:  Including: redness, swelling, discharge such as pus, or odor from site.    Look for bleeding from site:  If bleeding occurs hold pressure for 5 minutes with paper towel, check site if still bleeding hold for 5 more minutes  If site continues to bleed after 10 minutes call 911.    You received moderate sedation:  - Do not drive a car, or operate any machinery or power tools of any kind.  - Do not drink any alcoholic drinks.  - Do not take any over the counter medications that may cause drowsiness.  - Do not make any important decisions or sign any legal documents.  - You need to have a responsible adult accompany you home.  - You may resume your normal diet.  - We strongly suggest that a responsible adult be with you for the rest of the day and also during the night. This is for your protection and safety.     For questions related to your procedure:  Please call [phone] between the hours of 7:00am-5:00pm Monday through Friday.  Please call [phone] for Resident on call after 5:00pm weeknights, weekends, and holidays.     In the event of an emergency call 911 or go to your nearest emergency room.

## 2024-06-07 ENCOUNTER — INFUSION (OUTPATIENT)
Dept: HEMATOLOGY/ONCOLOGY | Facility: CLINIC | Age: 39
End: 2024-06-07
Payer: COMMERCIAL

## 2024-06-07 VITALS
TEMPERATURE: 97.2 F | RESPIRATION RATE: 18 BRPM | SYSTOLIC BLOOD PRESSURE: 109 MMHG | HEART RATE: 87 BPM | DIASTOLIC BLOOD PRESSURE: 75 MMHG | OXYGEN SATURATION: 99 %

## 2024-06-07 DIAGNOSIS — Z17.0 MALIGNANT NEOPLASM OF BREAST IN FEMALE, ESTROGEN RECEPTOR POSITIVE, UNSPECIFIED LATERALITY, UNSPECIFIED SITE OF BREAST (MULTI): ICD-10-CM

## 2024-06-07 DIAGNOSIS — C50.919 MALIGNANT NEOPLASM OF BREAST IN FEMALE, ESTROGEN RECEPTOR POSITIVE, UNSPECIFIED LATERALITY, UNSPECIFIED SITE OF BREAST (MULTI): ICD-10-CM

## 2024-06-07 PROCEDURE — 96402 CHEMO HORMON ANTINEOPL SQ/IM: CPT

## 2024-06-07 PROCEDURE — 2500000004 HC RX 250 GENERAL PHARMACY W/ HCPCS (ALT 636 FOR OP/ED): Mod: JZ | Performed by: STUDENT IN AN ORGANIZED HEALTH CARE EDUCATION/TRAINING PROGRAM

## 2024-06-07 RX ORDER — EPINEPHRINE 0.3 MG/.3ML
0.3 INJECTION SUBCUTANEOUS EVERY 5 MIN PRN
OUTPATIENT
Start: 2024-08-16

## 2024-06-07 RX ORDER — DIPHENHYDRAMINE HYDROCHLORIDE 50 MG/ML
50 INJECTION INTRAMUSCULAR; INTRAVENOUS AS NEEDED
OUTPATIENT
Start: 2024-08-16

## 2024-06-07 RX ORDER — FAMOTIDINE 10 MG/ML
20 INJECTION INTRAVENOUS ONCE AS NEEDED
OUTPATIENT
Start: 2024-08-16

## 2024-06-07 RX ORDER — ALBUTEROL SULFATE 0.83 MG/ML
3 SOLUTION RESPIRATORY (INHALATION) AS NEEDED
OUTPATIENT
Start: 2024-08-16

## 2024-06-07 RX ADMIN — LEUPROLIDE ACETATE 11.25 MG: KIT at 11:28

## 2024-06-07 ASSESSMENT — PAIN SCALES - GENERAL: PAINLEVEL: 0-NO PAIN

## 2024-06-07 NOTE — PROGRESS NOTES
1827 VSS, pt govind chemo injection well, c/o fever & flu-like symptoms after her 1st Zometa infusion 2 weeks ago that lasted 2-3 days, than felt fine. Pt instr to call Dr. Alvarenga's nurse partner & inform them of this info., pt verbalizes understanding. Pt disch amb by herself.

## 2024-07-02 ENCOUNTER — APPOINTMENT (OUTPATIENT)
Dept: OBSTETRICS AND GYNECOLOGY | Facility: CLINIC | Age: 39
End: 2024-07-02
Payer: COMMERCIAL

## 2024-07-02 VITALS
WEIGHT: 100.6 LBS | SYSTOLIC BLOOD PRESSURE: 112 MMHG | DIASTOLIC BLOOD PRESSURE: 74 MMHG | HEIGHT: 62 IN | BODY MASS INDEX: 18.51 KG/M2

## 2024-07-02 DIAGNOSIS — Z01.419 PAP SMEAR, AS PART OF ROUTINE GYNECOLOGICAL EXAMINATION: ICD-10-CM

## 2024-07-02 DIAGNOSIS — Z01.419 ENCOUNTER FOR ANNUAL ROUTINE GYNECOLOGICAL EXAMINATION: Primary | ICD-10-CM

## 2024-07-02 PROCEDURE — 3074F SYST BP LT 130 MM HG: CPT | Performed by: OBSTETRICS & GYNECOLOGY

## 2024-07-02 PROCEDURE — 3078F DIAST BP <80 MM HG: CPT | Performed by: OBSTETRICS & GYNECOLOGY

## 2024-07-02 PROCEDURE — 1036F TOBACCO NON-USER: CPT | Performed by: OBSTETRICS & GYNECOLOGY

## 2024-07-02 PROCEDURE — 3008F BODY MASS INDEX DOCD: CPT | Performed by: OBSTETRICS & GYNECOLOGY

## 2024-07-02 PROCEDURE — 87624 HPV HI-RISK TYP POOLED RSLT: CPT

## 2024-07-02 PROCEDURE — 99395 PREV VISIT EST AGE 18-39: CPT | Performed by: OBSTETRICS & GYNECOLOGY

## 2024-07-02 NOTE — PROGRESS NOTES
Subjective   Patient ID: Argelia Clay is a 39 y.o. female who presents for Annual Exam (Patient here for annual/Pt is taking [id number]-5/2022/Declined chaperone).  HPI  Patient is a 39-year-old  2 para 2 white female who had 1 vaginal delivery followed by .  Known history of breast cancer for which she had chemo, radiation was on tamoxifen last year now is on anastrozole.  Breast exams per breast specialist.  She denies any vaginal bleeding she admits to regular bowel movements denies any urinary symptoms.  Did have pelvic ultrasound last year due to tamoxifen which was normal.  Review of Systems    Objective   Physical Exam  Gen.: Alert and in no acute distress. Well-developed, well-nourished.  Thyroid: Nonenlarged and no palpable thyroid nodules  Cardiovascular: Heart regular rate and rhythm  Pulmonary: Clear bilateral breath sounds  Breasts: Deferred per patient request  Abdomen: Soft and nontender, no abdominal mass palpated, no organomegaly   Pelvic: External genitalia normal, Bartholin's urethral and [place]'s glands normal. Vagina normal. Cervix normal. Uterus normal size and shape. Right adnexa normal without masses. Left adnexa normal without masses. Perianal area and normal.  Assessment/Plan   Annual GYN exam, Pap and HPV done, breast management per breast specialist.  Patient will follow-up in 1 year or as needed.         Chidi Meléndez MD 24 10:46 AM

## 2024-07-15 LAB
CYTOLOGY CMNT CVX/VAG CYTO-IMP: NORMAL
HPV HR 12 DNA GENITAL QL NAA+PROBE: NEGATIVE
HPV HR GENOTYPES PNL CVX NAA+PROBE: NEGATIVE
HPV16 DNA SPEC QL NAA+PROBE: NEGATIVE
HPV18 DNA SPEC QL NAA+PROBE: NEGATIVE
LAB AP HPV GENOTYPE QUESTION: YES
LAB AP HPV HR: NORMAL
LABORATORY COMMENT REPORT: NORMAL
PATH REPORT.TOTAL CANCER: NORMAL

## 2024-08-09 ENCOUNTER — TELEPHONE (OUTPATIENT)
Dept: HEMATOLOGY/ONCOLOGY | Facility: HOSPITAL | Age: 39
End: 2024-08-09
Payer: COMMERCIAL

## 2024-08-09 NOTE — TELEPHONE ENCOUNTER
Spoke with patient and reminded her about the appointment with María on 08/13 @1:30pm.     Patient had questions about the location. This nurse provided the location and directions to the clinic once in the building. Argelia verbalized understand of the instructions.

## 2024-08-13 ENCOUNTER — OFFICE VISIT (OUTPATIENT)
Dept: HEMATOLOGY/ONCOLOGY | Facility: HOSPITAL | Age: 39
End: 2024-08-13
Payer: COMMERCIAL

## 2024-08-13 VITALS
SYSTOLIC BLOOD PRESSURE: 111 MMHG | TEMPERATURE: 97.2 F | WEIGHT: 100.75 LBS | OXYGEN SATURATION: 98 % | RESPIRATION RATE: 15 BRPM | HEIGHT: 66 IN | DIASTOLIC BLOOD PRESSURE: 72 MMHG | BODY MASS INDEX: 16.19 KG/M2 | HEART RATE: 90 BPM

## 2024-08-13 DIAGNOSIS — I89.0 LYMPHEDEMA OF LEFT ARM: Primary | ICD-10-CM

## 2024-08-13 DIAGNOSIS — C50.912 INVASIVE DUCTAL CARCINOMA OF LEFT BREAST (MULTI): ICD-10-CM

## 2024-08-13 PROCEDURE — 99215 OFFICE O/P EST HI 40 MIN: CPT | Performed by: NURSE PRACTITIONER

## 2024-08-13 PROCEDURE — 3074F SYST BP LT 130 MM HG: CPT | Performed by: NURSE PRACTITIONER

## 2024-08-13 PROCEDURE — 3078F DIAST BP <80 MM HG: CPT | Performed by: NURSE PRACTITIONER

## 2024-08-13 PROCEDURE — 3008F BODY MASS INDEX DOCD: CPT | Performed by: NURSE PRACTITIONER

## 2024-08-13 RX ORDER — ATORVASTATIN CALCIUM 10 MG/1
10 TABLET, FILM COATED ORAL DAILY
COMMUNITY

## 2024-08-13 RX ORDER — DIPHENHYDRAMINE HYDROCHLORIDE 50 MG/ML
50 INJECTION INTRAMUSCULAR; INTRAVENOUS AS NEEDED
OUTPATIENT
Start: 2024-11-25

## 2024-08-13 RX ORDER — FAMOTIDINE 10 MG/ML
20 INJECTION INTRAVENOUS ONCE AS NEEDED
OUTPATIENT
Start: 2024-11-25

## 2024-08-13 RX ORDER — IBUPROFEN 200 MG
CAPSULE ORAL
Qty: 1 EACH | Refills: 1 | Status: SHIPPED | OUTPATIENT
Start: 2024-08-13 | End: 2024-08-13 | Stop reason: ENTERED-IN-ERROR

## 2024-08-13 RX ORDER — EPINEPHRINE 0.3 MG/.3ML
0.3 INJECTION SUBCUTANEOUS EVERY 5 MIN PRN
OUTPATIENT
Start: 2024-11-25

## 2024-08-13 RX ORDER — ALBUTEROL SULFATE 0.83 MG/ML
3 SOLUTION RESPIRATORY (INHALATION) AS NEEDED
OUTPATIENT
Start: 2024-11-25

## 2024-08-13 RX ORDER — SODIUM CHLORIDE 9 MG/ML
500 INJECTION, SOLUTION INTRAVENOUS CONTINUOUS
OUTPATIENT
Start: 2024-11-25

## 2024-08-13 ASSESSMENT — PAIN SCALES - GENERAL: PAINLEVEL: 0-NO PAIN

## 2024-08-13 ASSESSMENT — COLUMBIA-SUICIDE SEVERITY RATING SCALE - C-SSRS
1. IN THE PAST MONTH, HAVE YOU WISHED YOU WERE DEAD OR WISHED YOU COULD GO TO SLEEP AND NOT WAKE UP?: NO
2. HAVE YOU ACTUALLY HAD ANY THOUGHTS OF KILLING YOURSELF?: NO
6. HAVE YOU EVER DONE ANYTHING, STARTED TO DO ANYTHING, OR PREPARED TO DO ANYTHING TO END YOUR LIFE?: NO

## 2024-08-13 ASSESSMENT — ENCOUNTER SYMPTOMS
OCCASIONAL FEELINGS OF UNSTEADINESS: 0
LOSS OF SENSATION IN FEET: 0
DEPRESSION: 0

## 2024-08-13 NOTE — PROGRESS NOTES
Oncology Follow-Up    Argelia   [id number]        Cancer Staging   No matching staging information was found for the patient.    Oncology History   Invasive ductal carcinoma of left breast (Multi)   3/16/2023 - 11/3/2023 Chemotherapy    Pertuzumab + Trastuzumab, 21 Day Cycles     9/10/2023 Initial Diagnosis    Invasive ductal carcinoma of left breast (CMS/HCC)        April 2022: Developed left bloody nipple dischargefter the delivery of her baby.    8/1/2022: Diagnostic mammogram and ultrasound showed extremely dense breasts, and an irregular mass with pleomorphic calcification in the central medial left breast at midline. An asymmetry was identified in the medial right breast at midline at posterior  depth, probably benign. Ultrasound characterized an irregular hypoechoic mass measuring 3.8 x 1.3 x 2.1 cm at 9:00, 2 cm FTN. Targeted ultrasound of the left axilla showed a borderline lymph node measuring 0.8 cm with borderline thickness of the cortex.      8/1/22: Ultrasound-guided biopsy of left breast 9:00, 2 cm FTN which showed grade 3 invasive ductal carcinoma ER+ 10% [place]+ 10% HER2 3+.  An additional biopsy of the left breast subareolar region showed intraductal papilloma.  Left axillary node biopsy  was negative.     8/10/22: Genetic testing ([employer]) was negative.   8/18/22: Breast tumor conference with recommendations for neoadjuvant chemotherapy.   9/1/22: CT  CAP: 2.3 cm hypervascular mass along the medial aspect of the left breast with prominent L axillary LN with a fatty notch but thickened cortex. Subcentimeter ground glass nodule left lower lobe consistent with metastatic focus.     9/1/22: NM bone scan: No evidence of metastatic disease   9/13/22: Subcentimeter ground glass nodule in the superior aspect of the LLL without hypermetabolic activity. Hypermetabolic R thyroid nodule.    9/16/22: First TCHP, completed 12/30/22 2/9/23: L PM and SLNBx: no residual disease, ypT0N0   3/29/23: Total  thyroidectomy   5/12/23: completed XRT   6/1/23: started tamoxifen  11/1/23: Tamoxifen d/c'd - initiated anastrozole. Continue Leuprolide every 12 weeks.  11/3/23: Completed adjuvant Herceptin/perjeta        Subjective    Ms. Clay presents for her Initial Survivorship Oncology Visit. She was seen previously by Dr. Alvarenga. She has tears in her eyes as she talks about the discomfort she had with her first Zometa infusion. She states that she had a fever and could not move for a few days. She is nervous about her next infusion. She asks why she needs 3 years of treatment. Ms. Clay continues on anastrozole with good tolerance. She also tolerates her Lupron injections. She reports weight gain, mostly in her abdomen. She is down a few pounds from her prior weight. She used to weigh in the 90's and is now in the low 100's. Ms. Clay rates her energy level as 7-8/10 and reports no distress. She again has a skin lesion (eczema?) on a different part of her left breast. She states she gets them on the right side as well though they heal in a few days. She feels the radiation is what causes the lesions to not heal fasty. Ms. Clay denies any unusual headaches, balance issues, depression, cough, shortness of breath, problems swallowing, changes in chest/breast area (surgical discomfort in the axilla only), abdominal pain, bone or muscle pain, vaginal bleeding, rectal bleeding, blood in the urine, vaginal dryness, swelling arms or legs, new or unusual skin moles or lesions. There is a slight language barrier. We were able to communicate, however.    Objective      Vitals:    08/13/24 1346   BP: 111/72   Pulse: 90   Resp: 15   Temp: 36.2 °C (97.2 °F)   SpO2: 98%        Constitutional: Well developed, alert/oriented x3, no distress, cooperative   Eyes: clear sclera   ENMT: mucous membranes moist, no apparent lesions   Head/Neck: Neck supple, no bruits   Respiratory/Thorax: Patent airways, normal breath sounds with good chest expansion    Cardiovascular: Regular rate and rhythm, no murmurs, 2+ equal pulses of the extremities,   Gastrointestinal: Nondistended, soft, non-tender, no masses palpable, no organomegaly   Musculoskeletal: ROM intact, no joint swelling, normal strength   Extremities: normal extremities, no edema, cyanosis, contusions or wounds   Neurological: alert and oriented x3,  normal strength   Breast:     Lymphatic: No significant lymphadenopathy   Psychological: Appropriate mood and behavior   Skin: Warm and dry, no lesions, no rashes      Physical Exam  Chest:          Comments: Left breast + for breast conserving surgery with well healed central/proximal and left axillary incisions; no masses, nodules, skin changes, discharge. There is an area of induration that outlines the lesion, appears to be healing. Right breast without masses, nodules, skin changes, discharge. Both breasts with dense breast tissue.       Lab Results   Component Value Date    WBC 4.0 (L) 2023    HGB 12.4 2023    HCT 38.6 2023    MCV 82 2023     2023       Chemistry    Lab Results   Component Value Date/Time     2024 1041    K 3.3 (L) 2024 1041     2024 1041    CO2 31 2024 1041    BUN 12 2024 1041    CREATININE 0.52 2024 1041    Lab Results   Component Value Date/Time    CALCIUM 9.3 2024 1041    ALKPHOS 53 2024 1041    AST 16 2024 1041    ALT 23 2024 1041    BILITOT 0.4 2024 1041           Imagin/15/2023 10:42 [phone]     Exam Information    Status Exam Begun Exam Ended   Final  9/15/2023 09:03     Study Result    Narrative   Interpreted By:  KEVIN STOVALL MD and PATI VANESSA MD  MRN: [id number]  Patient Name: BASSAM JOHNSON     STUDY:  DIGITAL DIAG MAMM BILAT WITH JOSH;  9/15/2023 9:03 am     ACCESSION NUMBER(S):  [id number]     ORDERING CLINICIAN:  LIANE KAPLAN     INDICATION:  Interval left lumpectomy and radiation.      COMPARISON:  02/08/2023, 01/13/2023, 08/01/2022.     FINDINGS:  2D and tomosynthesis images were reviewed at 1 mm slice thickness.     Density:  The breast tissue is extremely dense, which may limit the  sensitivity of mammography.     Interval postlumpectomy changes with scarring and associated surgical  clips are seen in the central medial left breast at middle to  posterior depth. There is slight retraction of the overlying skin.  Left axillary postsurgical scarring is noted. No suspicious masses or  calcifications are identified in either breast.      Impression   New left post treatment related changes. No mammographic evidence of  malignancy in either breast.     BI-RADS CATEGORY:  Category: 2 - Benign.  Recommendation: 1 Year Screening.       Assessment/Plan    Ms. Clay is a 40 yo woman with a hx of Stage II Her2+ IDC breast cancer diagnosed August 2022. She is s/p neoadjuvant TCHP, partial mastectomy with cPR (ypT0N0), XRT, and was started on tamoxifen 6/1/23 with transition to anastrozole 11/14/23. She is on Lupron for ovarian suppression and Zoledronic acid for bone metastasis prevention. There is no evidence of recurrent disease on today's exam.     Plan:  Exam is negative.  Will add additional IVF to her next Zoledronic acid infusion (11/25 @9:30).She will call if she still has intolerable side effects.  Continue anastrozole 1mg daily and Lupron injections every three months (next 8/30/24@8:30am).   If skin lesion on breast continues, will refer to dermatology. Prefer using olive oil or mild moisturizer to keep moist, not Vaseline.  Encouraged monthly breast self exams, plant based diet, keep alcohol <3 drinks/week, exercise at least 2.5 hours/week.  We reviewed signs/symptoms of recurrence including new masses, new pigmented lesion, tugging or pulling of the skin, nipple discharge, rash in or around the chest area, or any new finding that doesn't resolve within a 2-3 weeks.  All of Mary  questions/concerns were addressed.  Over 30 minutes of time was spent with this patient with >50% of the time with education, counseling, and coordination of care.   She will see her radiation oncology provider in December.  I will see her back in March.   Argelia will call with any concerns.          Diagnoses and all orders for this visit:  Invasive ductal carcinoma of left breast (Multi)  -     Clinic Appointment Request Follow Up; [facility]  -     Clinic Appointment Request Follow Up; [facility]ANG [facility]ST[facility]; Future          ASHLIE Alexandre-CNP

## 2024-08-13 NOTE — PATIENT INSTRUCTIONS
1. Exercise 2.5 hours per week; bone strengthening, cardio-vascular, resistance training.  2. Please do self breast exams monthly.  3. Keep alcohol under 3 drinks per week.  4. Sun safety - limit sun exposure from 11a-2p when its at its hottest, apply 15-30 sun block and re-apply every 1-2 hours if perspiring or swimming.  5. Eat a plant based diet, add in oily fishes such as mackerel, tuna, and salmon.  6. Get in at least 1,000 mg of calcium per day through diet or supplement for bone strength. Examples of foods higher in calcium are milk, yogurt, fruited yogurt, oranges, fortified orange juice, almonds, almond milk, broccoli, spinach, bok melissa, mustard greens, puddings, custards, ice cream, fortified cereals, bars, and crackers.   7. Continue anastrozole 1mg daily. Continue Lupron every 12 weeks. Your next injection is due 8/30/24@8:30am.   8. Your next Zometa infusion is scheduled 11/25/24@9:30a.m. I will add IV fluids to off set your side effects. Please call or contact me through [employer] if you still have severe side effects.  9. Please see Dr. Nicole 9/20/24 @8:00 for your mammogram and office visit.  10. Please call the office if any new mass or rash in or around breast, or any uncontrolled symptoms that last over 2-3 weeks at [phone].  11. Your exam is negative.  12. It was nice meeting you today, Argelia. I will see you back in March. Please call me with any questions or concerns.  Thank you for choosing [facility] for your care.

## 2024-08-22 ENCOUNTER — APPOINTMENT (OUTPATIENT)
Dept: HEMATOLOGY/ONCOLOGY | Facility: CLINIC | Age: 39
End: 2024-08-22
Payer: COMMERCIAL

## 2024-08-27 ENCOUNTER — LAB (OUTPATIENT)
Dept: LAB | Facility: LAB | Age: 39
End: 2024-08-27
Payer: COMMERCIAL

## 2024-08-27 DIAGNOSIS — E55.9 VITAMIN D DEFICIENCY, UNSPECIFIED: ICD-10-CM

## 2024-08-27 DIAGNOSIS — Z85.850 PERSONAL HISTORY OF MALIGNANT NEOPLASM OF THYROID: Primary | ICD-10-CM

## 2024-08-27 LAB
25(OH)D3 SERPL-MCNC: 34 NG/ML (ref 30–100)
ALBUMIN SERPL BCP-MCNC: 4.5 G/DL (ref 3.4–5)
ALP SERPL-CCNC: 46 U/L (ref 33–110)
ALT SERPL W P-5'-P-CCNC: 17 U/L (ref 7–45)
ANION GAP SERPL CALC-SCNC: 12 MMOL/L (ref 10–20)
AST SERPL W P-5'-P-CCNC: 15 U/L (ref 9–39)
BILIRUB SERPL-MCNC: 0.5 MG/DL (ref 0–1.2)
BUN SERPL-MCNC: 11 MG/DL (ref 6–23)
CALCIUM SERPL-MCNC: 9.4 MG/DL (ref 8.6–10.3)
CHLORIDE SERPL-SCNC: 103 MMOL/L (ref 98–107)
CO2 SERPL-SCNC: 29 MMOL/L (ref 21–32)
CREAT SERPL-MCNC: 0.53 MG/DL (ref 0.5–1.05)
EGFRCR SERPLBLD CKD-EPI 2021: >90 ML/MIN/1.73M*2
GLUCOSE SERPL-MCNC: 135 MG/DL (ref 74–99)
POTASSIUM SERPL-SCNC: 3.6 MMOL/L (ref 3.5–5.3)
PROT SERPL-MCNC: 7.5 G/DL (ref 6.4–8.2)
SODIUM SERPL-SCNC: 140 MMOL/L (ref 136–145)
TSH SERPL-ACNC: 0.01 MIU/L (ref 0.44–3.98)

## 2024-08-27 PROCEDURE — 84443 ASSAY THYROID STIM HORMONE: CPT

## 2024-08-27 PROCEDURE — 82306 VITAMIN D 25 HYDROXY: CPT

## 2024-08-27 PROCEDURE — 86800 THYROGLOBULIN ANTIBODY: CPT

## 2024-08-27 PROCEDURE — 84432 ASSAY OF THYROGLOBULIN: CPT

## 2024-08-27 PROCEDURE — 80053 COMPREHEN METABOLIC PANEL: CPT

## 2024-08-29 ENCOUNTER — APPOINTMENT (OUTPATIENT)
Dept: HEMATOLOGY/ONCOLOGY | Facility: CLINIC | Age: 39
End: 2024-08-29
Payer: COMMERCIAL

## 2024-08-29 LAB
BILL ONLY-THYROGLOBULIN: NORMAL
THYROGLOB AB SERPL-ACNC: <0.9 IU/ML (ref 0–4)
THYROGLOB AB SERPL-ACNC: <0.9 IU/ML (ref 0–4)
THYROGLOB SERPL-MCNC: <0.1 NG/ML (ref 1.3–31.8)
THYROGLOB SERPL-MCNC: ABNORMAL NG/ML (ref 1.3–31.8)

## 2024-08-30 ENCOUNTER — INFUSION (OUTPATIENT)
Dept: HEMATOLOGY/ONCOLOGY | Facility: CLINIC | Age: 39
End: 2024-08-30
Payer: COMMERCIAL

## 2024-08-30 VITALS
TEMPERATURE: 97 F | HEART RATE: 79 BPM | DIASTOLIC BLOOD PRESSURE: 68 MMHG | SYSTOLIC BLOOD PRESSURE: 108 MMHG | RESPIRATION RATE: 18 BRPM | OXYGEN SATURATION: 98 %

## 2024-08-30 DIAGNOSIS — Z17.0 MALIGNANT NEOPLASM OF BREAST IN FEMALE, ESTROGEN RECEPTOR POSITIVE, UNSPECIFIED LATERALITY, UNSPECIFIED SITE OF BREAST (MULTI): ICD-10-CM

## 2024-08-30 DIAGNOSIS — C50.919 MALIGNANT NEOPLASM OF BREAST IN FEMALE, ESTROGEN RECEPTOR POSITIVE, UNSPECIFIED LATERALITY, UNSPECIFIED SITE OF BREAST (MULTI): ICD-10-CM

## 2024-08-30 PROCEDURE — 2500000004 HC RX 250 GENERAL PHARMACY W/ HCPCS (ALT 636 FOR OP/ED): Mod: JZ | Performed by: STUDENT IN AN ORGANIZED HEALTH CARE EDUCATION/TRAINING PROGRAM

## 2024-08-30 PROCEDURE — 96402 CHEMO HORMON ANTINEOPL SQ/IM: CPT

## 2024-08-30 RX ORDER — DIPHENHYDRAMINE HYDROCHLORIDE 50 MG/ML
50 INJECTION INTRAMUSCULAR; INTRAVENOUS AS NEEDED
OUTPATIENT
Start: 2024-11-03

## 2024-08-30 RX ORDER — FAMOTIDINE 10 MG/ML
20 INJECTION INTRAVENOUS ONCE AS NEEDED
OUTPATIENT
Start: 2024-11-03

## 2024-08-30 RX ORDER — ALBUTEROL SULFATE 0.83 MG/ML
3 SOLUTION RESPIRATORY (INHALATION) AS NEEDED
OUTPATIENT
Start: 2024-11-03

## 2024-08-30 RX ORDER — EPINEPHRINE 0.3 MG/.3ML
0.3 INJECTION SUBCUTANEOUS EVERY 5 MIN PRN
OUTPATIENT
Start: 2024-11-03

## 2024-08-30 ASSESSMENT — PAIN SCALES - GENERAL: PAINLEVEL: 0-NO PAIN

## 2024-09-19 NOTE — PROGRESS NOTES
BREAST SURGICAL ONCOLOGY FOLLOW UP     Subjective   Argelia Clay is a 39 y.o. female presents today for follow up carcinoma of the left breast.   She is doing well.  About a month ago she noticed a small mass in her left breast.  No pain or redness.    Treatment history:  Stage IIA (T2 N0) left breast cancer diagnosed in August 2022. Invasive ductal carcinoma, grade 3; ER 10%, OK 10%, HER2 positive.  Genetics negative -August 2022.     Neoadjuvant chemotherapy (Dr. Delcid; [facility]).     Surgery: Left lumpectomy and sentinel lymph node biopsy on February 9, 2023. Pathology showed a pathologic complete response; ypT0 N0.     Completed radiation therapy in May 2023.  Started tamoxifen on June 1, 2023; switched to anastrozole on November 1, 2023  Completed Herceptin and Perjeta in November 2023.    She also has a history of papillary thyroid cancer for which she had a total thyroidectomy in March 2023         Mammogram: Bilateral mammogram today shows posttreatment changes in the left breast and no evidence of malignancy.  Left breast ultrasound at the area of the palpable concern shows no no concerning findings and no evidence of malignancy.  Category 2.    Radiology review: All images and reports were personally reviewed and the findings discussed with the patient.     Review of Systems   Constitutional symptoms: Denies generalized fatigue.  Denies weight change, fevers/chills, difficulty sleeping   Eyes: Denies double vision, glaucoma, cataracts.  Ear/nose/throat/mouth: Denies hearing changes, sore throat, sinus problems.  Cardiovascular: No chest pain.  Denies irregular heartbeat.  Denies ankle swelling.  Respiratory: No wheezing, cough, or shortness of breath.  Gastrointestinal: No abdominal pain,  No nausea/vomiting.  No indigestion/heartburn.  No change in bowel habits.  No constipation or diarrhea.   Genitourinary: No urinary incontinence.  No urinary frequency.  No painful urination.  Musculoskeletal: No bone  pain, no muscle pain, no joint pain.   Integumentary: No rash. No masses.  No changes in moles.  No easy bruising.  Neurological: No headaches.  No tremors. No numbness/tingling.  Psychiatric: No anxiety. No depression.  Endocrine: No excessive thirst.  Not too hot or too cold.  Not tired or fatigued.    Hematological/lymphatic: No swollen glands or blood clotting problems.  No bruising.    PHYSICAL EXAM:    General: Alert and oriented x 3.  Mood and affect are appropriate.  Neck: supple, no masses, no cervical adenopathy.  Cardiovascular: no lower extremity edema.  Pulmonary: breathing non labored on room air.  GI: Abdomen soft, no masses. No hepatomegaly or splenomegaly.  Lymph nodes: No supraclavicular or axillary adenopathy bilaterally.  Musculoskeletal: Full range of motion in the upper extremities bilaterally.  Neuro: denies dizziness, tremors  Physical Exam  Chest:          Comments: Lymph node exam shows no cervical, supraclavicular, or axillary lymphadenopathy.  Breast exam shows symmetric breasts bilaterally with no skin changes, no dominant masses and no nipple discharge in either breast.  Mild thickening in the 11:00 area at the edge of the areola which is where the patient is feeling a palpable mass.          Assessment/Plan     Stage IIA left breast cancer diagnosed in August 2022.   -[id number]   Invasive ductal carcinoma, grade 3; ER 10%, OK 10%, HER2 positive.    Clinical breast exam and mammogram today are normal.  There is no evidence of cancer recurrence.    Continue follow up with medical oncology as scheduled.  Continue anastrozole.  Continue yearly mammograms.    Will follow up with me if there are any breast concerns.   Lizeth Nicole MD

## 2024-09-20 ENCOUNTER — HOSPITAL ENCOUNTER (OUTPATIENT)
Dept: RADIOLOGY | Facility: CLINIC | Age: 39
Discharge: HOME | End: 2024-09-20
Payer: COMMERCIAL

## 2024-09-20 ENCOUNTER — APPOINTMENT (OUTPATIENT)
Dept: RADIOLOGY | Facility: CLINIC | Age: 39
End: 2024-09-20
Payer: COMMERCIAL

## 2024-09-20 ENCOUNTER — OFFICE VISIT (OUTPATIENT)
Dept: SURGICAL ONCOLOGY | Facility: CLINIC | Age: 39
End: 2024-09-20
Payer: COMMERCIAL

## 2024-09-20 VITALS — HEIGHT: 62 IN | WEIGHT: 98 LBS | BODY MASS INDEX: 18.03 KG/M2

## 2024-09-20 VITALS
DIASTOLIC BLOOD PRESSURE: 71 MMHG | TEMPERATURE: 97.9 F | SYSTOLIC BLOOD PRESSURE: 106 MMHG | BODY MASS INDEX: 18.29 KG/M2 | HEART RATE: 73 BPM | WEIGHT: 100 LBS

## 2024-09-20 DIAGNOSIS — Z17.0 MALIGNANT NEOPLASM OF LEFT BREAST IN FEMALE, ESTROGEN RECEPTOR POSITIVE, UNSPECIFIED SITE OF BREAST: ICD-10-CM

## 2024-09-20 DIAGNOSIS — C50.912 INVASIVE DUCTAL CARCINOMA OF LEFT BREAST (MULTI): ICD-10-CM

## 2024-09-20 DIAGNOSIS — C50.912 MALIGNANT NEOPLASM OF LEFT BREAST IN FEMALE, ESTROGEN RECEPTOR POSITIVE, UNSPECIFIED SITE OF BREAST: Primary | ICD-10-CM

## 2024-09-20 DIAGNOSIS — Z17.0 MALIGNANT NEOPLASM OF LEFT BREAST IN FEMALE, ESTROGEN RECEPTOR POSITIVE, UNSPECIFIED SITE OF BREAST: Primary | ICD-10-CM

## 2024-09-20 DIAGNOSIS — C50.912 MALIGNANT NEOPLASM OF LEFT BREAST IN FEMALE, ESTROGEN RECEPTOR POSITIVE, UNSPECIFIED SITE OF BREAST: ICD-10-CM

## 2024-09-20 PROCEDURE — 76981 USE PARENCHYMA: CPT | Mod: LT

## 2024-09-20 PROCEDURE — 1036F TOBACCO NON-USER: CPT | Performed by: SURGERY

## 2024-09-20 PROCEDURE — 3074F SYST BP LT 130 MM HG: CPT | Performed by: SURGERY

## 2024-09-20 PROCEDURE — 3078F DIAST BP <80 MM HG: CPT | Performed by: SURGERY

## 2024-09-20 PROCEDURE — 99213 OFFICE O/P EST LOW 20 MIN: CPT | Performed by: SURGERY

## 2024-09-20 PROCEDURE — 76642 ULTRASOUND BREAST LIMITED: CPT | Mod: LT

## 2024-09-20 PROCEDURE — 77062 BREAST TOMOSYNTHESIS BI: CPT

## 2024-09-20 ASSESSMENT — PAIN SCALES - GENERAL: PAINLEVEL: 0-NO PAIN

## 2024-09-20 NOTE — PATIENT INSTRUCTIONS
You no longer need to see a breast surgeon. Please follow-up in the breast clinic as needed. Continue yearly mammograms.

## 2024-11-04 ENCOUNTER — APPOINTMENT (OUTPATIENT)
Dept: HEMATOLOGY/ONCOLOGY | Facility: CLINIC | Age: 39
End: 2024-11-04
Payer: COMMERCIAL

## 2024-11-25 ENCOUNTER — APPOINTMENT (OUTPATIENT)
Dept: HEMATOLOGY/ONCOLOGY | Facility: CLINIC | Age: 39
End: 2024-11-25
Payer: COMMERCIAL

## 2024-11-25 DIAGNOSIS — C50.919 MALIGNANT NEOPLASM OF BREAST IN FEMALE, ESTROGEN RECEPTOR POSITIVE, UNSPECIFIED LATERALITY, UNSPECIFIED SITE OF BREAST: Primary | ICD-10-CM

## 2024-11-25 DIAGNOSIS — Z17.0 MALIGNANT NEOPLASM OF BREAST IN FEMALE, ESTROGEN RECEPTOR POSITIVE, UNSPECIFIED LATERALITY, UNSPECIFIED SITE OF BREAST: Primary | ICD-10-CM

## 2024-11-25 RX ORDER — HEPARIN SODIUM,PORCINE/PF 10 UNIT/ML
50 SYRINGE (ML) INTRAVENOUS AS NEEDED
Status: CANCELLED | OUTPATIENT
Start: 2024-11-25

## 2024-11-25 RX ORDER — HEPARIN 100 UNIT/ML
500 SYRINGE INTRAVENOUS AS NEEDED
Status: CANCELLED | OUTPATIENT
Start: 2024-11-25

## 2024-11-26 ENCOUNTER — APPOINTMENT (OUTPATIENT)
Dept: HEMATOLOGY/ONCOLOGY | Facility: CLINIC | Age: 39
End: 2024-11-26
Payer: COMMERCIAL

## 2024-11-26 ENCOUNTER — APPOINTMENT (OUTPATIENT)
Dept: LAB | Facility: CLINIC | Age: 39
End: 2024-11-26
Payer: COMMERCIAL

## 2024-11-26 ENCOUNTER — INFUSION (OUTPATIENT)
Dept: HEMATOLOGY/ONCOLOGY | Facility: CLINIC | Age: 39
End: 2024-11-26
Payer: COMMERCIAL

## 2024-11-26 VITALS
WEIGHT: 100.75 LBS | DIASTOLIC BLOOD PRESSURE: 81 MMHG | RESPIRATION RATE: 18 BRPM | TEMPERATURE: 98.2 F | OXYGEN SATURATION: 100 % | SYSTOLIC BLOOD PRESSURE: 106 MMHG | HEART RATE: 57 BPM | BODY MASS INDEX: 18.43 KG/M2

## 2024-11-26 DIAGNOSIS — C50.919 MALIGNANT NEOPLASM OF BREAST IN FEMALE, ESTROGEN RECEPTOR POSITIVE, UNSPECIFIED LATERALITY, UNSPECIFIED SITE OF BREAST: ICD-10-CM

## 2024-11-26 DIAGNOSIS — C50.912 INVASIVE DUCTAL CARCINOMA OF LEFT BREAST (MULTI): ICD-10-CM

## 2024-11-26 DIAGNOSIS — Z17.0 MALIGNANT NEOPLASM OF BREAST IN FEMALE, ESTROGEN RECEPTOR POSITIVE, UNSPECIFIED LATERALITY, UNSPECIFIED SITE OF BREAST: ICD-10-CM

## 2024-11-26 LAB
ALBUMIN SERPL BCP-MCNC: 5.1 G/DL (ref 3.4–5)
ALP SERPL-CCNC: 52 U/L (ref 33–110)
ALT SERPL W P-5'-P-CCNC: 19 U/L (ref 7–45)
ANION GAP SERPL CALC-SCNC: 11 MMOL/L (ref 10–20)
AST SERPL W P-5'-P-CCNC: 20 U/L (ref 9–39)
BILIRUB SERPL-MCNC: 0.3 MG/DL (ref 0–1.2)
BUN SERPL-MCNC: 9 MG/DL (ref 6–23)
CALCIUM SERPL-MCNC: 9.7 MG/DL (ref 8.6–10.3)
CHLORIDE SERPL-SCNC: 104 MMOL/L (ref 98–107)
CO2 SERPL-SCNC: 28 MMOL/L (ref 21–32)
CREAT SERPL-MCNC: 0.53 MG/DL (ref 0.5–1.05)
EGFRCR SERPLBLD CKD-EPI 2021: >90 ML/MIN/1.73M*2
GLUCOSE SERPL-MCNC: 71 MG/DL (ref 74–99)
MAGNESIUM SERPL-MCNC: 2.02 MG/DL (ref 1.6–2.4)
PHOSPHATE SERPL-MCNC: 3.3 MG/DL (ref 2.5–4.9)
POTASSIUM SERPL-SCNC: 3.7 MMOL/L (ref 3.5–5.3)
PROT SERPL-MCNC: 8.5 G/DL (ref 6.4–8.2)
SODIUM SERPL-SCNC: 139 MMOL/L (ref 136–145)

## 2024-11-26 PROCEDURE — 84100 ASSAY OF PHOSPHORUS: CPT

## 2024-11-26 PROCEDURE — 96401 CHEMO ANTI-NEOPL SQ/IM: CPT

## 2024-11-26 PROCEDURE — 80053 COMPREHEN METABOLIC PANEL: CPT

## 2024-11-26 PROCEDURE — 83735 ASSAY OF MAGNESIUM: CPT

## 2024-11-26 PROCEDURE — 2500000004 HC RX 250 GENERAL PHARMACY W/ HCPCS (ALT 636 FOR OP/ED): Mod: JZ,SE | Performed by: STUDENT IN AN ORGANIZED HEALTH CARE EDUCATION/TRAINING PROGRAM

## 2024-11-26 PROCEDURE — 96365 THER/PROPH/DIAG IV INF INIT: CPT | Mod: INF

## 2024-11-26 PROCEDURE — 96360 HYDRATION IV INFUSION INIT: CPT | Mod: INF

## 2024-11-26 PROCEDURE — 2500000004 HC RX 250 GENERAL PHARMACY W/ HCPCS (ALT 636 FOR OP/ED): Mod: SE | Performed by: STUDENT IN AN ORGANIZED HEALTH CARE EDUCATION/TRAINING PROGRAM

## 2024-11-26 RX ORDER — EPINEPHRINE 0.3 MG/.3ML
0.3 INJECTION SUBCUTANEOUS EVERY 5 MIN PRN
OUTPATIENT
Start: 2025-01-26

## 2024-11-26 RX ORDER — DIPHENHYDRAMINE HYDROCHLORIDE 50 MG/ML
50 INJECTION INTRAMUSCULAR; INTRAVENOUS AS NEEDED
Status: CANCELLED | OUTPATIENT
Start: 2024-11-26

## 2024-11-26 RX ORDER — EPINEPHRINE 0.3 MG/.3ML
0.3 INJECTION SUBCUTANEOUS EVERY 5 MIN PRN
Status: CANCELLED | OUTPATIENT
Start: 2024-11-26

## 2024-11-26 RX ORDER — ZOLEDRONIC ACID 0.04 MG/ML
4 INJECTION, SOLUTION INTRAVENOUS ONCE
Status: COMPLETED | OUTPATIENT
Start: 2024-11-26 | End: 2024-11-26

## 2024-11-26 RX ORDER — EPINEPHRINE 0.3 MG/.3ML
0.3 INJECTION SUBCUTANEOUS EVERY 5 MIN PRN
OUTPATIENT
Start: 2025-04-22

## 2024-11-26 RX ORDER — ALBUTEROL SULFATE 0.83 MG/ML
3 SOLUTION RESPIRATORY (INHALATION) AS NEEDED
OUTPATIENT
Start: 2025-01-26

## 2024-11-26 RX ORDER — FAMOTIDINE 10 MG/ML
20 INJECTION INTRAVENOUS ONCE AS NEEDED
OUTPATIENT
Start: 2025-01-26

## 2024-11-26 RX ORDER — ZOLEDRONIC ACID 0.04 MG/ML
4 INJECTION, SOLUTION INTRAVENOUS ONCE
OUTPATIENT
Start: 2025-04-22

## 2024-11-26 RX ORDER — ALBUTEROL SULFATE 0.83 MG/ML
3 SOLUTION RESPIRATORY (INHALATION) AS NEEDED
OUTPATIENT
Start: 2025-04-22

## 2024-11-26 RX ORDER — HEPARIN 100 UNIT/ML
500 SYRINGE INTRAVENOUS AS NEEDED
OUTPATIENT
Start: 2024-11-26

## 2024-11-26 RX ORDER — DIPHENHYDRAMINE HYDROCHLORIDE 50 MG/ML
50 INJECTION INTRAMUSCULAR; INTRAVENOUS AS NEEDED
OUTPATIENT
Start: 2025-04-22

## 2024-11-26 RX ORDER — FAMOTIDINE 10 MG/ML
20 INJECTION INTRAVENOUS ONCE AS NEEDED
OUTPATIENT
Start: 2025-04-22

## 2024-11-26 RX ORDER — DIPHENHYDRAMINE HYDROCHLORIDE 50 MG/ML
50 INJECTION INTRAMUSCULAR; INTRAVENOUS AS NEEDED
OUTPATIENT
Start: 2025-01-26

## 2024-11-26 RX ORDER — FAMOTIDINE 10 MG/ML
20 INJECTION INTRAVENOUS ONCE AS NEEDED
Status: CANCELLED | OUTPATIENT
Start: 2024-11-26

## 2024-11-26 RX ORDER — HEPARIN SODIUM,PORCINE/PF 10 UNIT/ML
50 SYRINGE (ML) INTRAVENOUS AS NEEDED
OUTPATIENT
Start: 2024-11-26

## 2024-11-26 RX ORDER — ALBUTEROL SULFATE 0.83 MG/ML
3 SOLUTION RESPIRATORY (INHALATION) AS NEEDED
Status: CANCELLED | OUTPATIENT
Start: 2024-11-26

## 2024-11-26 ASSESSMENT — PAIN SCALES - GENERAL: PAINLEVEL_OUTOF10: 0-NO PAIN

## 2024-12-20 ENCOUNTER — HOSPITAL ENCOUNTER (OUTPATIENT)
Dept: RADIATION ONCOLOGY | Facility: CLINIC | Age: 39
Setting detail: RADIATION/ONCOLOGY SERIES
Discharge: HOME | End: 2024-12-20
Payer: COMMERCIAL

## 2024-12-20 VITALS
OXYGEN SATURATION: 98 % | HEART RATE: 74 BPM | RESPIRATION RATE: 18 BRPM | WEIGHT: 101.2 LBS | BODY MASS INDEX: 18.51 KG/M2 | DIASTOLIC BLOOD PRESSURE: 70 MMHG | TEMPERATURE: 98.1 F | SYSTOLIC BLOOD PRESSURE: 109 MMHG

## 2024-12-20 DIAGNOSIS — C50.912 INVASIVE DUCTAL CARCINOMA OF LEFT BREAST (MULTI): Primary | ICD-10-CM

## 2024-12-20 PROCEDURE — 99213 OFFICE O/P EST LOW 20 MIN: CPT | Performed by: STUDENT IN AN ORGANIZED HEALTH CARE EDUCATION/TRAINING PROGRAM

## 2024-12-20 ASSESSMENT — ENCOUNTER SYMPTOMS
GASTROINTESTINAL NEGATIVE: 1
EYES NEGATIVE: 1
CARDIOVASCULAR NEGATIVE: 1
ARTHRALGIAS: 1
PSYCHIATRIC NEGATIVE: 1
RESPIRATORY NEGATIVE: 1
ENDOCRINE NEGATIVE: 1
NEUROLOGICAL NEGATIVE: 1
CONSTITUTIONAL NEGATIVE: 1
HEMATOLOGIC/LYMPHATIC NEGATIVE: 1

## 2024-12-20 ASSESSMENT — PAIN SCALES - GENERAL: PAINLEVEL_OUTOF10: 0-NO PAIN

## 2024-12-20 NOTE — PROGRESS NOTES
Radiation Oncology Follow-Up    Patient Name:  Argelia Clay  MRN:  [id number]  :  1985    Primary Care Provider: Larry Alvarenga MD  Care Team: Patient Care Team:  Larry Alvarenga MD as PCP - General (Hematology and Oncology)  Larry Alvarenga MD as Consulting Physician (Hematology and Oncology)    Date of Service: 2024     SUBJECTIVE  History of Present Illness:   Argelia Clay is a 39 y.o. female with triple positive invasive ductal carcinoma of the left breast, [id number] -> ypT0N0 s/p leticia-adjuvant TCHP, left breast lumpectomy and SLNB. S/p adjuvant RT left breast with a lumpectomy bed boost to 52.56 Gy in 20 fx, completed 2023, and herceptin/perjeta. Synchronous diagnosis of papillary thyroid carcinoma s/p thyroidectomy.  She follows with cardiology and in May 2024 underwent a cardiac calcium exam, with a coronary calcium score of 0.  She had her port removed in 2024.  She underwent bilateral mammograms on 2024, at which time she was describing a palpable lump at the prior left breast lumpectomy site.  Mammogram showed postsurgical changes in the lumpectomy cavity without evidence of a suspicious lesion.  Ultrasound of the left breast showed postsurgical scarring at left breast 11:00.   She states she is doing well without breast pain or swelling.  She can palpate the area of scarring in her upper left breast, though this does not cause her discomfort.  Denies range of motion limitations at the shoulder or upper extremity swelling.  She endorses episodes of dry skin and eczema over the left breast, for which she uses Vaseline.  She reports occasional bone pains with Zometa.    Review of Systems:   Review of Systems - Oncology  - please see nursing note    Performance Status:   The Karnofsky performance scale today is 90, Able to carry on normal activity; minor signs or symptoms of disease (ECOG equivalent 0).      OBJECTIVE  /70   Pulse 74   Temp 36.7 °C (98.1 °F)   Resp 18   Wt 45.9  kg (101 lb 3.2 oz)   SpO2 98%   BMI 18.51 kg/m²    Physical Exam  Vitals reviewed.   Constitutional:       General: She is not in acute distress.  HENT:      Head: Normocephalic and atraumatic.   Pulmonary:      Effort: Pulmonary effort is normal. No respiratory distress.   Chest:      Comments: Left s/p lumpectomy, mild induration in the upper left breast consistent with postradiation fibrosis.   Limited patch of eczema in the lower left breast.  No palpable masses of the left or right breast.  Musculoskeletal:      Comments: ROM intact at the shoulders. No upper extremity lymphedema.   Lymphadenopathy:      Upper Body:      Right upper body: No axillary adenopathy.      Left upper body: No axillary adenopathy.   Skin:     General: Skin is warm and dry.   Psychiatric:         Mood and Affect: Mood normal.         Behavior: Behavior normal.           ASSESSMENT:  Argelia Clay is a 39 y.o. female with triple positive invasive ductal carcinoma of the left breast, [id number] -> ypT0N0 s/p leticia-adjuvant TCHP, left breast lumpectomy and SLNB. S/p adjuvant RT left breast with a lumpectomy bed boost to 52.56 Gy in 20 fx, completed 5/2023, and herceptin/perjeta.     PLAN:   FAWN on exam and mammograms. She is doing well after her radiotherapy course.  We discussed the use of occasional gentle self massage over the area of fibrosis in the upper left breast.  I discussed continued moisturizer for dry skin of the left breast.  She has continued oncology follow-up.  She will follow-up in my clinic as needed, and is encouraged to reach out at any time with questions regarding her prior treatments or with any new concerns.    NCCN Guidelines were applicable to guide this patients treatment plan.    Thank you for allowing me to participate in this patient's care.    Rosalio Munoz MD  Department of Radiation Oncology  [facility]    Portions of this note were generated using voice recognition software, and may be subject  to transcription errors.

## 2024-12-20 NOTE — PROGRESS NOTES
Radiation Oncology Nursing Note    Pain: The patient's current pain level was assessed.  They report currently having a pain of 0 out of 10.  They feel their pain is under control without the use of pain medications.    Review of Systems:  Review of Systems   Constitutional: Negative.    HENT:  Negative.     Eyes: Negative.    Respiratory: Negative.     Cardiovascular: Negative.    Gastrointestinal: Negative.    Endocrine: Negative.    Genitourinary: Negative.     Musculoskeletal:  Positive for arthralgias.        Bone pain   Skin: Negative.    Neurological: Negative.    Hematological: Negative.    Psychiatric/Behavioral: Negative.

## 2025-01-20 DIAGNOSIS — Z17.0 MALIGNANT NEOPLASM OF BREAST IN FEMALE, ESTROGEN RECEPTOR POSITIVE, UNSPECIFIED LATERALITY, UNSPECIFIED SITE OF BREAST: ICD-10-CM

## 2025-01-20 DIAGNOSIS — C50.919 MALIGNANT NEOPLASM OF BREAST IN FEMALE, ESTROGEN RECEPTOR POSITIVE, UNSPECIFIED LATERALITY, UNSPECIFIED SITE OF BREAST: ICD-10-CM

## 2025-01-20 DIAGNOSIS — C50.912 INVASIVE DUCTAL CARCINOMA OF LEFT BREAST (MULTI): Primary | ICD-10-CM

## 2025-02-25 ENCOUNTER — INFUSION (OUTPATIENT)
Dept: HEMATOLOGY/ONCOLOGY | Facility: CLINIC | Age: 40
End: 2025-02-25
Payer: COMMERCIAL

## 2025-02-25 VITALS
RESPIRATION RATE: 18 BRPM | HEART RATE: 87 BPM | SYSTOLIC BLOOD PRESSURE: 123 MMHG | DIASTOLIC BLOOD PRESSURE: 73 MMHG | OXYGEN SATURATION: 98 % | TEMPERATURE: 98.1 F

## 2025-02-25 DIAGNOSIS — C50.919 MALIGNANT NEOPLASM OF BREAST IN FEMALE, ESTROGEN RECEPTOR POSITIVE, UNSPECIFIED LATERALITY, UNSPECIFIED SITE OF BREAST: ICD-10-CM

## 2025-02-25 DIAGNOSIS — Z17.0 MALIGNANT NEOPLASM OF BREAST IN FEMALE, ESTROGEN RECEPTOR POSITIVE, UNSPECIFIED LATERALITY, UNSPECIFIED SITE OF BREAST: ICD-10-CM

## 2025-02-25 PROCEDURE — 2500000004 HC RX 250 GENERAL PHARMACY W/ HCPCS (ALT 636 FOR OP/ED): Mod: JZ,SE | Performed by: STUDENT IN AN ORGANIZED HEALTH CARE EDUCATION/TRAINING PROGRAM

## 2025-02-25 PROCEDURE — 96401 CHEMO ANTI-NEOPL SQ/IM: CPT

## 2025-02-25 RX ORDER — ALBUTEROL SULFATE 0.83 MG/ML
3 SOLUTION RESPIRATORY (INHALATION) AS NEEDED
OUTPATIENT
Start: 2025-04-20

## 2025-02-25 RX ORDER — FAMOTIDINE 10 MG/ML
20 INJECTION, SOLUTION INTRAVENOUS ONCE AS NEEDED
OUTPATIENT
Start: 2025-04-20

## 2025-02-25 RX ORDER — DIPHENHYDRAMINE HYDROCHLORIDE 50 MG/ML
50 INJECTION INTRAMUSCULAR; INTRAVENOUS AS NEEDED
OUTPATIENT
Start: 2025-04-20

## 2025-02-25 RX ORDER — EPINEPHRINE 0.3 MG/.3ML
0.3 INJECTION SUBCUTANEOUS EVERY 5 MIN PRN
OUTPATIENT
Start: 2025-04-20

## 2025-02-25 RX ADMIN — LEUPROLIDE ACETATE 11.25 MG: KIT at 10:34

## 2025-02-25 ASSESSMENT — PAIN SCALES - GENERAL: PAINLEVEL_OUTOF10: 0-NO PAIN

## 2025-02-26 ENCOUNTER — APPOINTMENT (OUTPATIENT)
Dept: HEMATOLOGY/ONCOLOGY | Facility: CLINIC | Age: 40
End: 2025-02-26
Payer: COMMERCIAL

## 2025-03-11 ENCOUNTER — OFFICE VISIT (OUTPATIENT)
Dept: HEMATOLOGY/ONCOLOGY | Facility: HOSPITAL | Age: 40
End: 2025-03-11
Payer: COMMERCIAL

## 2025-03-11 VITALS
TEMPERATURE: 97.7 F | DIASTOLIC BLOOD PRESSURE: 73 MMHG | HEART RATE: 69 BPM | SYSTOLIC BLOOD PRESSURE: 110 MMHG | RESPIRATION RATE: 16 BRPM | OXYGEN SATURATION: 98 % | WEIGHT: 100.53 LBS | BODY MASS INDEX: 18.39 KG/M2

## 2025-03-11 DIAGNOSIS — Z13.820 ENCOUNTER FOR SCREENING FOR OSTEOPOROSIS: ICD-10-CM

## 2025-03-11 DIAGNOSIS — Z12.31 ENCOUNTER FOR SCREENING MAMMOGRAM FOR MALIGNANT NEOPLASM OF BREAST: Primary | ICD-10-CM

## 2025-03-11 DIAGNOSIS — C50.912 INVASIVE DUCTAL CARCINOMA OF LEFT BREAST (MULTI): ICD-10-CM

## 2025-03-11 PROCEDURE — 3074F SYST BP LT 130 MM HG: CPT | Performed by: NURSE PRACTITIONER

## 2025-03-11 PROCEDURE — 99214 OFFICE O/P EST MOD 30 MIN: CPT | Performed by: NURSE PRACTITIONER

## 2025-03-11 PROCEDURE — 3078F DIAST BP <80 MM HG: CPT | Performed by: NURSE PRACTITIONER

## 2025-03-11 RX ORDER — ANASTROZOLE 1 MG/1
1 TABLET ORAL DAILY
Qty: 30 TABLET | Refills: 6 | Status: SHIPPED | OUTPATIENT
Start: 2025-03-11

## 2025-03-11 ASSESSMENT — PAIN SCALES - GENERAL: PAINLEVEL_OUTOF10: 0-NO PAIN

## 2025-03-11 ASSESSMENT — PATIENT HEALTH QUESTIONNAIRE - PHQ9
2. FEELING DOWN, DEPRESSED OR HOPELESS: NOT AT ALL
SUM OF ALL RESPONSES TO PHQ9 QUESTIONS 1 & 2: 0
1. LITTLE INTEREST OR PLEASURE IN DOING THINGS: NOT AT ALL

## 2025-03-11 NOTE — PROGRESS NOTES
Oncology Follow-Up    Argelia Clay  [id number]        Cancer Staging   Breast cancer (Multi)  Staging form: Breast, AJCC 8th Edition  - Clinical stage from 8/1/2022: Stage IB (cT2, cN0, cM0, G3, ER+, MT+, HER2+) - Signed by Lizeth Nicole MD on 9/19/2024  - Pathologic stage from 2/9/2023: ypT0, pN0(sn), cM0, G3, ER+, MT+, HER2+ - Signed by Lizeth Nicole MD on 9/19/2024    Oncology History   Invasive ductal carcinoma of left breast (Multi)   3/16/2023 - 11/3/2023 Chemotherapy    Pertuzumab + Trastuzumab, 21 Day Cycles     9/10/2023 Initial Diagnosis    Invasive ductal carcinoma of left breast (CMS/HCC)     Breast cancer (Multi)   8/1/2022 Cancer Staged    Staging form: Breast, AJCC 8th Edition, Clinical stage from 8/1/2022: Stage IB (cT2, cN0, cM0, G3, ER+, MT+, HER2+) - Signed by Lizeth Nicole MD on 9/19/2024 2/9/2023 Cancer Staged    Staging form: Breast, AJCC 8th Edition, Pathologic stage from 2/9/2023: ypT0, pN0(sn), cM0, G3, ER+, MT+, HER2+ - Signed by Lizeth Nicole MD on 9/19/2024 11/1/2023 Initial Diagnosis    Breast cancer (Multi)        April 2022: Developed left bloody nipple dischargefter the delivery of her baby.    8/1/2022: Diagnostic mammogram and ultrasound showed extremely dense breasts, and an irregular mass with pleomorphic calcification in the central medial left breast at midline. An asymmetry was identified in the medial right breast at midline at posterior  depth, probably benign. Ultrasound characterized an irregular hypoechoic mass measuring 3.8 x 1.3 x 2.1 cm at 9:00, 2 cm FTN. Targeted ultrasound of the left axilla showed a borderline lymph node measuring 0.8 cm with borderline thickness of the cortex.      8/1/22: Ultrasound-guided biopsy of left breast 9:00, 2 cm FTN which showed grade 3 invasive ductal carcinoma ER+ 10% MT+ 10% HER2 3+.  An additional biopsy of the left breast subareolar region showed intraductal papilloma.  Left axillary node biopsy  was negative.     8/10/22: Genetic  testing ([employer]) was negative.   8/18/22: Breast tumor conference with recommendations for neoadjuvant chemotherapy.   9/1/22: CT  CAP: 2.3 cm hypervascular mass along the medial aspect of the left breast with prominent L axillary LN with a fatty notch but thickened cortex. Subcentimeter ground glass nodule left lower lobe consistent with metastatic focus.     9/1/22: NM bone scan: No evidence of metastatic disease   9/13/22: Subcentimeter ground glass nodule in the superior aspect of the LLL without hypermetabolic activity. Hypermetabolic R thyroid nodule.    9/16/22: First TCHP, completed 12/30/22 2/9/23: L PM and SLNBx: no residual disease, ypT0N0   3/29/23: Total thyroidectomy   5/12/23: completed XRT   6/1/23: started tamoxifen  11/1/23: Tamoxifen d/c'd - initiated anastrozole. Continue Leuprolide every 12 weeks.  11/3/23: Completed adjuvant Herceptin/perjeta         Marlene Stout presents for her Oncology Follow Up Visit. She states that her rash is still there but improved. She continues on anastrozole and lupron with good tolerance. She had an ultrasound of a mass she felt near her left nipple that was negative, when she saw Dr. Nicole this past September. Argelia denies any unusual headaches, balance issues, depression, cough, shortness of breath, problems swallowing, changes in chest/breast area, abdominal pain, bone or muscle pain, vaginal bleeding, rectal bleeding, blood in the urine, vaginal dryness, swelling arms or legs, new or unusual skin moles or lesions.     Objective      Vitals:    03/11/25 1353   BP: 110/73   Pulse: 69   Resp: 16   Temp: 36.5 °C (97.7 °F)   SpO2: 98%        Constitutional: Well developed, alert/oriented x3, no distress, cooperative   Eyes: clear sclera   ENMT: mucous membranes moist, no apparent lesions   Head/Neck: Neck supple, no bruits   Respiratory/Thorax: Patent airways, normal breath sounds with good chest expansion   Cardiovascular: Regular rate and rhythm, no  murmurs, 2+ equal pulses of the extremities,   Gastrointestinal: Nondistended, soft, non-tender, no masses palpable, no organomegaly   Musculoskeletal: ROM intact, no joint swelling, normal strength   Extremities: normal extremities, no edema, cyanosis, contusions or wounds   Neurological: alert and oriented x3,  normal strength   Breast:     Lymphatic: No significant lymphadenopathy   Psychological: Appropriate mood and behavior   Skin: Warm and dry, no lesions, no rashes      Physical Exam  Chest:          Comments: Right breast positive for breast conserving surgery with well healed central and left axillary incisions; no masses, nodules, skin changes, discharge. Circular non-raised rash (improved from last exam) left breast and along the axillary mid-line.  Right breast without masses, nodules, skin changes, discharge          Lab Results   Component Value Date    WBC 4.0 (L) 2023    HGB 12.4 2023    HCT 38.6 2023    MCV 82 2023     2023       Chemistry    Lab Results   Component Value Date/Time     2024 1042    K 3.7 2024 1042     2024 1042    CO2 28 2024 1042    BUN 9 2024 1042    CREATININE 0.53 2024 1042    Lab Results   Component Value Date/Time    CALCIUM 9.7 2024 1042    ALKPHOS 52 2024 1042    AST 20 2024 1042    ALT 19 2024 1042    BILITOT 0.3 2024 1042           Imagin2024 10:45 [phone]      Exam Information    Status Exam Begun Exam Ended   Final 2024 08:25 2024 09:02     Study Result    Narrative & Impression   Interpreted By:  Fiona Stout,   STUDY:  BI MAMMO BILATERAL DIAGNOSTIC TOMOSYNTHESIS; BI US BREAST LIMITED  LEFT;  2024 9:02 am; 2024 10:14 am      ACCESSION NUMBER(S):  [id number]; [id number]      ORDERING CLINICIAN:  LIANE KAPLAN      INDICATION:  Annual exam. History of left breast cancer treated with lumpectomy,  chemotherapy and  radiation. Patient also reports feeling a nodule at  the lumpectomy site.      ,C50.912 Malignant neoplasm of unspecified site of left female breast  (Multi); ,C50.912 Malignant neoplasm of unspecified site of left  female breast (Multi),Z17.0 Estrogen receptor positive status (ER+)      COMPARISON:  02/16/2024, 09/15/2023 and all relevant prior breast imaging exams  available at the time of dictation.      FINDINGS:  MAMMOGRAPHY: 2D and tomosynthesis images were reviewed at 1 mm slice  thickness.      Density:  The breasts are extremely dense, which lowers the  sensitivity of mammography.      Postsurgical changes of lumpectomy are noted in the lower inner left  breast at posterior depth and left axilla. Mild diffuse skin and  trabecular thickening of the left breast is consistent with  postradiation changes. An asymmetry was initially questioned in  central right breast at posterior depth which resolved into  fibroglandular tissue with similar appearance to prior exams dating  back to 2022. No suspicious mammographic finding is identified at the  lumpectomy site to account for the reported palpable concern.      No suspicious masses or calcifications are identified in either  breast.      ULTRASOUND: Targeted ultrasound with elastography was performed by a  registered sonographer and the interpreting radiologist in the upper  inner left breast. Postsurgical scarring is noted at 11 o'clock in  the subareolar region with no suspicious sonographic finding at the  lumpectomy site or within the skin.      IMPRESSION:  1. No mammographic or sonographic abnormality is identified at the  site of palpable clinical concern at the lumpectomy site in the left  breast. Clinical follow-up is recommended. If symptoms persist,  further evaluation with MRI could be considered.      2. No mammographic evidence of malignancy in either breast.      BI-RADS CATEGORY:  BI-RADS Category:  2 Benign.  Recommendation:  Clinical Follow-up  and Continued Annual Screening.  Recommended Date:  At the discretion of the ordering provider.  Laterality:  Left.     Assessment/Plan    Truman is a 41 yo woman with a diangosed is Her2 postivie left IDC G-3 diagnosed August 2022. She is s/p neoadjuvant TCHP chemo, partial mastectomy with a complete pathological response, XRT, tamoxifen with switch to anastrozole 11/14/23. There is no evidence of recurrent disease on today's exam.     Plan:  Exam is negative.  Rash is improving. Suggested visit to dermatology; declined at this time.  Continue lupron every 3 months and anastrozole 1mg daily.   Emphasized the importance of a bone density scan to establish a baseline. I will order this for the [facility]. location which is closer to Argelia's home.   Encouraged monthly breast self exams, plant based diet, keep alcohol <3 drinks/week, exercise at least 2.5 hours/week.  We reviewed signs/symptoms of recurrence including new masses, new pigmented lesion, tugging or pulling of the skin, nipple discharge, rash in or around the chest area, or any new finding that doesn't resolve within a 2-3 weeks.  All of Buster's questions/concerns were addressed.  Over 20 minutes of time was spent with this patient with >50% of the time with education, counseling, and coordination of care.   I will see Argelia back in late September with her mammogram as she no longer needs to see Dr. Nicole on a regular basis.     Diagnoses and all orders for this visit:  Encounter for screening mammogram for malignant neoplasm of breast  -     BI mammo bilateral screening tomosynthesis; Future  -     Clinic Appointment Request Follow Up; [facility]; Future  -     XR DEXA bone density; Future  Invasive ductal carcinoma of left breast (Multi)  -     Clinic Appointment Request Follow Up; [facility]  -     BI mammo bilateral screening tomosynthesis; Future  -     Clinic Appointment Request Follow Up; [facility]; Future  -     XR DEXA  bone density; Future  -     anastrozole (Arimidex) 1 mg tablet; Take 1 tablet (1 mg total) by mouth once daily.  Swallow whole with a drink of water.  Encounter for screening for osteoporosis  -     BI mammo bilateral screening tomosynthesis; Future  -     Clinic Appointment Request Follow Up; [facility]; Future  -     XR DEXA bone density; Future      Huang Christina, APRN-CNP

## 2025-03-11 NOTE — PATIENT INSTRUCTIONS
1. Exercise 2.5 hours per week; bone strengthening, cardio-vascular, resistance training.  2. Please do self breast exams monthly.  3. Keep alcohol under 3 drinks per week.  4. Sun safety - limit sun exposure from 11a-2p when its at its hottest, apply 15-30 sun block and re-apply every 1-2 hours if perspiring or swimming.  5. Eat a plant based diet, add in oily fishes such as mackerel, tuna, and salmon.  6. Get in at least 1,000 mg of calcium per day through diet or supplement for bone strength. Examples of foods higher in calcium are milk, yogurt, fruited yogurt, oranges, fortified orange juice, almonds, almond milk, broccoli, spinach, bok melissa, mustard greens, puddings, custards, ice cream, fortified cereals, bars, and crackers.   7. Continue anastrozole 1mg daily, and lupron injections. Please get your bone density scheduled. I will call you with those results.   8. Please call the office if any new mass or rash in or around breast, or any uncontrolled symptoms that last over 2-3 weeks at [phone].  9. Please call for results in 1-2 days if testing done at [phone].   10. It was nice seeing you today, Argelia. I will see you back in late September with your mammogram.  Thank you for choosing [facility] for your care.

## 2025-04-08 ENCOUNTER — HOSPITAL ENCOUNTER (OUTPATIENT)
Dept: RADIOLOGY | Facility: CLINIC | Age: 40
Discharge: HOME | End: 2025-04-08
Payer: COMMERCIAL

## 2025-04-08 DIAGNOSIS — Z13.820 ENCOUNTER FOR SCREENING FOR OSTEOPOROSIS: ICD-10-CM

## 2025-04-08 DIAGNOSIS — C50.912 INVASIVE DUCTAL CARCINOMA OF LEFT BREAST: ICD-10-CM

## 2025-04-08 DIAGNOSIS — Z12.31 ENCOUNTER FOR SCREENING MAMMOGRAM FOR MALIGNANT NEOPLASM OF BREAST: ICD-10-CM

## 2025-04-08 PROCEDURE — 77080 DXA BONE DENSITY AXIAL: CPT | Performed by: RADIOLOGY

## 2025-04-08 PROCEDURE — 77080 DXA BONE DENSITY AXIAL: CPT

## 2025-04-21 ENCOUNTER — APPOINTMENT (OUTPATIENT)
Dept: HEMATOLOGY/ONCOLOGY | Facility: CLINIC | Age: 40
End: 2025-04-21
Payer: COMMERCIAL

## 2025-04-21 ENCOUNTER — TELEPHONE (OUTPATIENT)
Dept: HEMATOLOGY/ONCOLOGY | Facility: CLINIC | Age: 40
End: 2025-04-21
Payer: COMMERCIAL

## 2025-04-21 NOTE — TELEPHONE ENCOUNTER
----- Message from María Tom sent at 4/21/2025  4:25 PM EDT -----  Please call Ms. Clay. She has slight osteopenia. She is on Zometa already. Can you discuss foods high in calcium as well as exercises to help bone density? Thank you!  ----- Message -----  From: Interface, Radiology Results In  Sent: 4/8/2025   3:51 PM EDT  To: María Tom, ASHLIE-CNP

## 2025-04-21 NOTE — TELEPHONE ENCOUNTER
"Nurse called patient to update them on bone density results. Patient a bit confused when reviewing the test and results. Nurse let her know that the test she did was to check on her bone health due to the medications/treatments she has had previously and still currently taking to manage recurrence of her breast cancer.    Patient explain that there is slight changes noting decrease in over bone health aka osteopenia. Patient currently is still taking zometa but patient did ask if she can take another medication like zometa so that her \"bones can go back to normal\" advise that typically we will do the 6 treatments of zometa for bone health and late bone related distant recurrences and then we can discuss placing patient on additonal treatments or referrals to rheumatology is needed. We will continue to monitor bone strength through every 2.5-3 yr dexa scans.     Encouraged patient to increase calcium intake and do bone strengthing exercises. Patient stating that she already take 500 mg of calcium. Encourage her to take at least 1000 mg of calcium daily. Nurse sent patient education information over [employer] so that patient can review.    LUCY KAUR RN     "

## 2025-05-01 ENCOUNTER — OFFICE VISIT (OUTPATIENT)
Dept: SURGERY | Facility: CLINIC | Age: 40
End: 2025-05-01
Payer: COMMERCIAL

## 2025-05-01 VITALS
SYSTOLIC BLOOD PRESSURE: 108 MMHG | BODY MASS INDEX: 17.83 KG/M2 | HEART RATE: 71 BPM | DIASTOLIC BLOOD PRESSURE: 72 MMHG | WEIGHT: 97.5 LBS

## 2025-05-01 DIAGNOSIS — C73 THYROID CANCER (MULTI): Primary | ICD-10-CM

## 2025-05-01 PROCEDURE — 99213 OFFICE O/P EST LOW 20 MIN: CPT | Mod: 25 | Performed by: SURGERY

## 2025-05-01 PROCEDURE — 3078F DIAST BP <80 MM HG: CPT | Performed by: SURGERY

## 2025-05-01 PROCEDURE — 3074F SYST BP LT 130 MM HG: CPT | Performed by: SURGERY

## 2025-05-01 PROCEDURE — 99213 OFFICE O/P EST LOW 20 MIN: CPT | Performed by: SURGERY

## 2025-05-01 PROCEDURE — 76536 US EXAM OF HEAD AND NECK: CPT | Performed by: SURGERY

## 2025-05-01 PROCEDURE — 1036F TOBACCO NON-USER: CPT | Performed by: SURGERY

## 2025-05-01 NOTE — PROGRESS NOTES
Subjective   Patient ID: Argelia Clay is a 40 y.o. female who presents for ongoing follow-up of stage I papillary thyroid cancer.    HPI I saw Mrs. Clay back in surgery clinic today.  She is now 2 years status post total thyroidectomy for stage I papillary thyroid cancer T1 N0.    She continues to follow with Dr. Isaac of medical endocrinology.  In March of this year, he updated blood work for her.  Her TSH looked good at 0.41.  Her previous one from October last year was a bit suppressed at 0.01.  Her last thyroglobulin level remained undetectable with negative antibody.    She denies any new changes in her neck.  She said she still occasionally feels some mild pressure in her neck.  This is not present every day.  No trouble swallowing.  No trouble breathing.  No issues with her voice.    She said everything is also going well from the standpoint of her breast cancer.    Review of Systems    Objective   Physical Exam  Vitals reviewed.   Constitutional:       Appearance: Normal appearance.      Comments: Her voice is normal   Neck:      Comments: Neck incision well-healed.  Thyroid surgically absent.  No palpable neck masses.  Trachea midline.  Lymphadenopathy:      Cervical: No cervical adenopathy.   Neurological:      Mental Status: She is alert.     Patient ID: Argelia Clay is a 40 y.o. female.    General    Date/Time: 5/1/2025 10:19 AM    Performed by: Hakeem Diane MD  Authorized by: Hakeem Diane MD    Consent:     Consent obtained:  Verbal    Consent given by:  Patient    Risks, benefits, and alternatives were discussed: yes      Alternatives discussed:  No treatment and observation  Universal protocol:     Procedure explained and questions answered to patient or proxy's satisfaction: yes      Relevant documents present and verified: yes      Test results available: yes    Procedure specific details:      Neck ultrasound    In the office I did a neck ultrasound with a 6-15 MHz linear ultrasound probe as  part of annual surveillance in a patient with a history of papillary thyroid cancer per ALENA guidelines.  Patient's thyroid bed looks normal.  Thyroid surgically absent.  No abnormal central neck lymph nodes. Her bilateral submandibular glands are smooth and uniform and normal.  I then examined her lateral neck lymph node compartments.  Levels 2 through 4 right and left sides.  No abnormal nodes seen.  On the left side largest lymph node present was level two 1.1 cm level 3- 0.7 cm right side level 2 0.9 cm left side 0.5 cm level 3 all the lymph nodes are oval-shaped normal fatty mustapha.  No atypical characteristics..  No abnormal nodes were seen.    Images were captured and reviewed with the patient.  Normal surveillance ultrasound  Post-procedure details:     Procedure completion:  Tolerated      Assessment/Plan    Mrs. Clay is now 2 years status post total thyroidectomy for stage I papillary thyroid cancer.  Her thyroglobulin level remains undetectable.  Her TSH looked appropriately suppressed at 0.41.    Her physical examination is normal.  Her ultrasound in the office today shows no abnormal cervical nodes no residual tissue in the thyroid bed.    At this point she does not show any evidence of cancer recurrence based on physical exam, radiographic exam or biochemical evaluation.    Plan    1.  I will see her back again in 1 year.    2.  She should keep all scheduled appointments with her endocrinologist.         Hakeem Diane MD 05/01/25 10:01 AM

## 2025-05-13 ENCOUNTER — APPOINTMENT (OUTPATIENT)
Dept: HEMATOLOGY/ONCOLOGY | Facility: CLINIC | Age: 40
End: 2025-05-13
Payer: COMMERCIAL

## 2025-05-27 ENCOUNTER — LAB (OUTPATIENT)
Dept: LAB | Facility: CLINIC | Age: 40
End: 2025-05-27
Payer: COMMERCIAL

## 2025-05-27 ENCOUNTER — INFUSION (OUTPATIENT)
Dept: HEMATOLOGY/ONCOLOGY | Facility: CLINIC | Age: 40
End: 2025-05-27
Payer: COMMERCIAL

## 2025-05-27 VITALS
DIASTOLIC BLOOD PRESSURE: 74 MMHG | OXYGEN SATURATION: 97 % | WEIGHT: 100.75 LBS | BODY MASS INDEX: 18.43 KG/M2 | TEMPERATURE: 97.3 F | HEART RATE: 73 BPM | SYSTOLIC BLOOD PRESSURE: 117 MMHG | RESPIRATION RATE: 18 BRPM

## 2025-05-27 DIAGNOSIS — Z17.0 MALIGNANT NEOPLASM OF BREAST IN FEMALE, ESTROGEN RECEPTOR POSITIVE, UNSPECIFIED LATERALITY, UNSPECIFIED SITE OF BREAST: ICD-10-CM

## 2025-05-27 DIAGNOSIS — C50.912 INVASIVE DUCTAL CARCINOMA OF LEFT BREAST: ICD-10-CM

## 2025-05-27 DIAGNOSIS — C50.919 MALIGNANT NEOPLASM OF BREAST IN FEMALE, ESTROGEN RECEPTOR POSITIVE, UNSPECIFIED LATERALITY, UNSPECIFIED SITE OF BREAST: ICD-10-CM

## 2025-05-27 LAB
ALBUMIN SERPL BCP-MCNC: 4.9 G/DL (ref 3.4–5)
ALP SERPL-CCNC: 48 U/L (ref 33–110)
ALT SERPL W P-5'-P-CCNC: 15 U/L (ref 7–45)
ANION GAP SERPL CALC-SCNC: 12 MMOL/L (ref 10–20)
AST SERPL W P-5'-P-CCNC: 13 U/L (ref 9–39)
BILIRUB SERPL-MCNC: 0.4 MG/DL (ref 0–1.2)
BUN SERPL-MCNC: 13 MG/DL (ref 6–23)
CALCIUM SERPL-MCNC: 9.7 MG/DL (ref 8.6–10.3)
CHLORIDE SERPL-SCNC: 106 MMOL/L (ref 98–107)
CO2 SERPL-SCNC: 27 MMOL/L (ref 21–32)
CREAT SERPL-MCNC: 0.47 MG/DL (ref 0.5–1.05)
EGFRCR SERPLBLD CKD-EPI 2021: >90 ML/MIN/1.73M*2
GLUCOSE SERPL-MCNC: 93 MG/DL (ref 74–99)
MAGNESIUM SERPL-MCNC: 1.84 MG/DL (ref 1.6–2.4)
PHOSPHATE SERPL-MCNC: 2.8 MG/DL (ref 2.5–4.9)
POTASSIUM SERPL-SCNC: 3.6 MMOL/L (ref 3.5–5.3)
PROT SERPL-MCNC: 8.2 G/DL (ref 6.4–8.2)
SODIUM SERPL-SCNC: 141 MMOL/L (ref 136–145)

## 2025-05-27 PROCEDURE — 96361 HYDRATE IV INFUSION ADD-ON: CPT | Mod: INF

## 2025-05-27 PROCEDURE — 84100 ASSAY OF PHOSPHORUS: CPT

## 2025-05-27 PROCEDURE — 96365 THER/PROPH/DIAG IV INF INIT: CPT | Mod: INF

## 2025-05-27 PROCEDURE — 96402 CHEMO HORMON ANTINEOPL SQ/IM: CPT

## 2025-05-27 PROCEDURE — 80053 COMPREHEN METABOLIC PANEL: CPT

## 2025-05-27 PROCEDURE — 83735 ASSAY OF MAGNESIUM: CPT

## 2025-05-27 PROCEDURE — 2500000004 HC RX 250 GENERAL PHARMACY W/ HCPCS (ALT 636 FOR OP/ED): Mod: JZ,SE | Performed by: STUDENT IN AN ORGANIZED HEALTH CARE EDUCATION/TRAINING PROGRAM

## 2025-05-27 PROCEDURE — 2500000004 HC RX 250 GENERAL PHARMACY W/ HCPCS (ALT 636 FOR OP/ED): Mod: SE,JZ | Performed by: STUDENT IN AN ORGANIZED HEALTH CARE EDUCATION/TRAINING PROGRAM

## 2025-05-27 PROCEDURE — 36415 COLL VENOUS BLD VENIPUNCTURE: CPT

## 2025-05-27 RX ORDER — ZOLEDRONIC ACID 0.04 MG/ML
4 INJECTION, SOLUTION INTRAVENOUS ONCE
OUTPATIENT
Start: 2025-10-28

## 2025-05-27 RX ORDER — FAMOTIDINE 10 MG/ML
20 INJECTION, SOLUTION INTRAVENOUS ONCE AS NEEDED
OUTPATIENT
Start: 2025-10-28

## 2025-05-27 RX ORDER — DIPHENHYDRAMINE HYDROCHLORIDE 50 MG/ML
50 INJECTION, SOLUTION INTRAMUSCULAR; INTRAVENOUS AS NEEDED
OUTPATIENT
Start: 2025-10-28

## 2025-05-27 RX ORDER — EPINEPHRINE 0.3 MG/.3ML
0.3 INJECTION SUBCUTANEOUS EVERY 5 MIN PRN
OUTPATIENT
Start: 2025-07-13

## 2025-05-27 RX ORDER — ALBUTEROL SULFATE 0.83 MG/ML
3 SOLUTION RESPIRATORY (INHALATION) AS NEEDED
OUTPATIENT
Start: 2025-07-13

## 2025-05-27 RX ORDER — DIPHENHYDRAMINE HYDROCHLORIDE 50 MG/ML
50 INJECTION, SOLUTION INTRAMUSCULAR; INTRAVENOUS AS NEEDED
OUTPATIENT
Start: 2025-07-13

## 2025-05-27 RX ORDER — ALBUTEROL SULFATE 0.83 MG/ML
3 SOLUTION RESPIRATORY (INHALATION) AS NEEDED
OUTPATIENT
Start: 2025-10-28

## 2025-05-27 RX ORDER — FAMOTIDINE 10 MG/ML
20 INJECTION, SOLUTION INTRAVENOUS ONCE AS NEEDED
OUTPATIENT
Start: 2025-07-13

## 2025-05-27 RX ORDER — ZOLEDRONIC ACID 0.04 MG/ML
4 INJECTION, SOLUTION INTRAVENOUS ONCE
Status: COMPLETED | OUTPATIENT
Start: 2025-05-27 | End: 2025-05-27

## 2025-05-27 RX ORDER — EPINEPHRINE 0.3 MG/.3ML
0.3 INJECTION SUBCUTANEOUS EVERY 5 MIN PRN
OUTPATIENT
Start: 2025-10-28

## 2025-05-27 RX ADMIN — ZOLEDRONIC ACID 4 MG: 0.04 INJECTION, SOLUTION INTRAVENOUS at 11:42

## 2025-05-27 RX ADMIN — SODIUM CHLORIDE 500 ML: 0.9 INJECTION, SOLUTION INTRAVENOUS at 12:12

## 2025-05-27 RX ADMIN — LEUPROLIDE ACETATE 11.25 MG: KIT at 11:04

## 2025-05-27 ASSESSMENT — PAIN SCALES - GENERAL
PAINLEVEL_OUTOF10: 0-NO PAIN
PAINLEVEL_OUTOF10: 0-NO PAIN

## 2025-07-01 LAB
NON-UH HIE A/G RATIO: 1.1
NON-UH HIE ALB: 4.5 G/DL (ref 3.4–5)
NON-UH HIE ALK PHOS: 56 UNIT/L (ref 45–117)
NON-UH HIE BASO COUNT: 0.02 X1000 (ref 0–0.2)
NON-UH HIE BASOS %: 0.4 %
NON-UH HIE BILIRUBIN, TOTAL: 0.5 MG/DL (ref 0.3–1.2)
NON-UH HIE BUN/CREAT RATIO: 21.7
NON-UH HIE BUN: 13 MG/DL (ref 9–23)
NON-UH HIE CALCIUM: 9.8 MG/DL (ref 8.7–10.4)
NON-UH HIE CALCULATED LDL CHOLESTEROL: 56 MG/DL (ref 60–130)
NON-UH HIE CALCULATED OSMOLALITY: 286 MOSM/KG (ref 275–295)
NON-UH HIE CHLORIDE: 105 MMOL/L (ref 98–107)
NON-UH HIE CHOLESTEROL: 115 MG/DL (ref 100–200)
NON-UH HIE CO2, VENOUS: 29 MMOL/L (ref 20–31)
NON-UH HIE CREATININE: 0.6 MG/DL (ref 0.5–0.8)
NON-UH HIE DIFF?: ABNORMAL
NON-UH HIE EOS COUNT: 0.04 X1000 (ref 0–0.5)
NON-UH HIE EOSIN %: 1 %
NON-UH HIE FREE T4: 1.59 NG/DL (ref 0.89–1.76)
NON-UH HIE GFR AA: >60
NON-UH HIE GLOBULIN: 4 G/DL
NON-UH HIE GLOMERULAR FILTRATION RATE: >60 ML/MIN/1.73M?
NON-UH HIE GLUCOSE: 88 MG/DL (ref 74–106)
NON-UH HIE GOT: 17 UNIT/L (ref 15–37)
NON-UH HIE GPT: 18 UNIT/L (ref 10–49)
NON-UH HIE HCT: 40.2 % (ref 36–46)
NON-UH HIE HDL CHOLESTEROL: 36 MG/DL (ref 40–60)
NON-UH HIE HGB A1C: 5.7 %
NON-UH HIE HGB: 13.1 G/DL (ref 12–16)
NON-UH HIE INSTR WBC: 4.1
NON-UH HIE K: 3.8 MMOL/L (ref 3.5–5.1)
NON-UH HIE LYMPH %: 51.9 %
NON-UH HIE LYMPH COUNT: 2.14 X1000 (ref 1.2–4.8)
NON-UH HIE MCH: 25.8 PG (ref 27–34)
NON-UH HIE MCHC: 32.6 G/DL (ref 32–37)
NON-UH HIE MCV: 79.3 FL (ref 80–100)
NON-UH HIE MONO %: 5.6 %
NON-UH HIE MONO COUNT: 0.23 X1000 (ref 0.1–1)
NON-UH HIE MPV: 8 FL (ref 7.4–10.4)
NON-UH HIE NA: 144 MMOL/L (ref 135–145)
NON-UH HIE NEUTROPHIL %: 41.1 %
NON-UH HIE NEUTROPHIL COUNT (ANC): 1.7 X1000 (ref 1.4–8.8)
NON-UH HIE NUCLEATED RBC: 0 /100WBC
NON-UH HIE PLATELET: 211 X10 (ref 150–450)
NON-UH HIE RBC: 5.07 X10 (ref 4.2–5.4)
NON-UH HIE RDW: 13.9 % (ref 11.5–14.5)
NON-UH HIE TOTAL CHOL/HDL CHOL RATIO: 3.2
NON-UH HIE TOTAL PROTEIN: 8.5 G/DL (ref 5.7–8.2)
NON-UH HIE TRIGLYCERIDES: 117 MG/DL (ref 30–150)
NON-UH HIE TSH: 0.05 UIU/ML (ref 0.55–4.78)
NON-UH HIE WBC: 4.1 X10 (ref 4.5–11)

## 2025-08-05 RX ORDER — SODIUM CHLORIDE 9 MG/ML
500 INJECTION, SOLUTION INTRAVENOUS CONTINUOUS
OUTPATIENT
Start: 2025-08-05

## 2025-08-12 ENCOUNTER — APPOINTMENT (OUTPATIENT)
Dept: OBSTETRICS AND GYNECOLOGY | Facility: CLINIC | Age: 40
End: 2025-08-12
Payer: COMMERCIAL

## 2025-08-12 VITALS
SYSTOLIC BLOOD PRESSURE: 109 MMHG | BODY MASS INDEX: 17.96 KG/M2 | WEIGHT: 97.6 LBS | DIASTOLIC BLOOD PRESSURE: 67 MMHG | HEIGHT: 62 IN

## 2025-08-12 DIAGNOSIS — Z01.419 ENCOUNTER FOR ANNUAL ROUTINE GYNECOLOGICAL EXAMINATION: Primary | ICD-10-CM

## 2025-08-12 PROCEDURE — 3008F BODY MASS INDEX DOCD: CPT | Performed by: OBSTETRICS & GYNECOLOGY

## 2025-08-12 PROCEDURE — 1036F TOBACCO NON-USER: CPT | Performed by: OBSTETRICS & GYNECOLOGY

## 2025-08-12 PROCEDURE — 3074F SYST BP LT 130 MM HG: CPT | Performed by: OBSTETRICS & GYNECOLOGY

## 2025-08-12 PROCEDURE — 3078F DIAST BP <80 MM HG: CPT | Performed by: OBSTETRICS & GYNECOLOGY

## 2025-08-12 PROCEDURE — 99395 PREV VISIT EST AGE 18-39: CPT | Performed by: OBSTETRICS & GYNECOLOGY

## 2025-08-26 ENCOUNTER — INFUSION (OUTPATIENT)
Dept: HEMATOLOGY/ONCOLOGY | Facility: CLINIC | Age: 40
End: 2025-08-26
Payer: COMMERCIAL

## 2025-08-26 VITALS
TEMPERATURE: 96.4 F | BODY MASS INDEX: 18.3 KG/M2 | DIASTOLIC BLOOD PRESSURE: 68 MMHG | RESPIRATION RATE: 18 BRPM | WEIGHT: 99.43 LBS | HEART RATE: 78 BPM | OXYGEN SATURATION: 99 % | HEIGHT: 62 IN | SYSTOLIC BLOOD PRESSURE: 118 MMHG

## 2025-08-26 DIAGNOSIS — C50.919 MALIGNANT NEOPLASM OF BREAST IN FEMALE, ESTROGEN RECEPTOR POSITIVE, UNSPECIFIED LATERALITY, UNSPECIFIED SITE OF BREAST: ICD-10-CM

## 2025-08-26 DIAGNOSIS — Z17.0 MALIGNANT NEOPLASM OF BREAST IN FEMALE, ESTROGEN RECEPTOR POSITIVE, UNSPECIFIED LATERALITY, UNSPECIFIED SITE OF BREAST: ICD-10-CM

## 2025-08-26 PROCEDURE — 2500000004 HC RX 250 GENERAL PHARMACY W/ HCPCS (ALT 636 FOR OP/ED): Mod: JZ,SE | Performed by: STUDENT IN AN ORGANIZED HEALTH CARE EDUCATION/TRAINING PROGRAM

## 2025-08-26 PROCEDURE — 96402 CHEMO HORMON ANTINEOPL SQ/IM: CPT

## 2025-08-26 RX ORDER — EPINEPHRINE 0.3 MG/.3ML
0.3 INJECTION SUBCUTANEOUS EVERY 5 MIN PRN
OUTPATIENT
Start: 2025-11-11

## 2025-08-26 RX ORDER — FAMOTIDINE 10 MG/ML
20 INJECTION, SOLUTION INTRAVENOUS ONCE AS NEEDED
OUTPATIENT
Start: 2025-11-11

## 2025-08-26 RX ORDER — DIPHENHYDRAMINE HYDROCHLORIDE 50 MG/ML
50 INJECTION, SOLUTION INTRAMUSCULAR; INTRAVENOUS AS NEEDED
OUTPATIENT
Start: 2025-11-11

## 2025-08-26 RX ORDER — ALBUTEROL SULFATE 0.83 MG/ML
3 SOLUTION RESPIRATORY (INHALATION) AS NEEDED
OUTPATIENT
Start: 2025-11-11

## 2025-08-26 RX ADMIN — LEUPROLIDE ACETATE 11.25 MG: KIT at 11:04

## 2025-08-26 ASSESSMENT — PAIN SCALES - GENERAL: PAINLEVEL_OUTOF10: 0-NO PAIN

## 2025-10-06 ENCOUNTER — APPOINTMENT (OUTPATIENT)
Dept: HEMATOLOGY/ONCOLOGY | Facility: CLINIC | Age: 40
End: 2025-10-06
Payer: COMMERCIAL